# Patient Record
Sex: MALE | Race: WHITE | NOT HISPANIC OR LATINO | URBAN - METROPOLITAN AREA
[De-identification: names, ages, dates, MRNs, and addresses within clinical notes are randomized per-mention and may not be internally consistent; named-entity substitution may affect disease eponyms.]

---

## 2019-01-29 ENCOUNTER — OUTPATIENT (OUTPATIENT)
Dept: OUTPATIENT SERVICES | Facility: HOSPITAL | Age: 59
LOS: 1 days | End: 2019-01-29
Payer: COMMERCIAL

## 2019-01-29 ENCOUNTER — APPOINTMENT (OUTPATIENT)
Dept: ULTRASOUND IMAGING | Facility: CLINIC | Age: 59
End: 2019-01-29
Payer: COMMERCIAL

## 2019-01-29 DIAGNOSIS — Z00.8 ENCOUNTER FOR OTHER GENERAL EXAMINATION: ICD-10-CM

## 2019-01-29 PROCEDURE — 76536 US EXAM OF HEAD AND NECK: CPT

## 2019-01-29 PROCEDURE — 76536 US EXAM OF HEAD AND NECK: CPT | Mod: 26

## 2019-06-13 ENCOUNTER — APPOINTMENT (OUTPATIENT)
Dept: SURGERY | Facility: CLINIC | Age: 59
End: 2019-06-13
Payer: COMMERCIAL

## 2019-06-13 VITALS
BODY MASS INDEX: 28.93 KG/M2 | TEMPERATURE: 98.6 F | DIASTOLIC BLOOD PRESSURE: 72 MMHG | HEIGHT: 78 IN | RESPIRATION RATE: 18 BRPM | HEART RATE: 93 BPM | WEIGHT: 250 LBS | OXYGEN SATURATION: 95 % | SYSTOLIC BLOOD PRESSURE: 116 MMHG

## 2019-06-13 PROCEDURE — 99204 OFFICE O/P NEW MOD 45 MIN: CPT

## 2019-06-13 NOTE — PHYSICAL EXAM
[Normal Breath Sounds] : Normal breath sounds [Normal Heart Sounds] : normal heart sounds [No HSM] : no hepatosplenomegaly [Calm] : calm [No Rash or Lesion] : No rash or lesion [Abdominal Masses] : No abdominal masses [JVD] : no jugular venous distention  [Abdomen Tenderness] : ~T ~M No abdominal tenderness [de-identified] : Well-developed well-nourished white male in no acute distress. [de-identified] : wnl [de-identified] : There are bilateral inguinal hernias the left larger than the right. The left cord and testicle is normal right cord and testicle are normal examination of the right anal area revealed a defect at the internal ring where is able to push some content back inside. [de-identified] : Normal strength and gait

## 2019-06-13 NOTE — HISTORY OF PRESENT ILLNESS
[de-identified] : Germain is a 58 y/o male s/p umbilical hernia repair 2/24/16. Patient reports having pain on right groin area. He patient was in his normal self when he had some episodes of constipation where he had to push hard to have a bowel movement and since then has noticed some pain in the right groin he gives a history of pushing on something that went back inside. Of note the patient had a CT scan in 2016 which revealed bilateral fat-containing inguinal hernias. There is been no nausea no vomiting etc.

## 2019-06-13 NOTE — ASSESSMENT
[FreeTextEntry1] : I discussed with the patient that he has had a known bilateral inguinal hernias since the CAT scan in 2016. On his examination he informed hernias bilaterally no hernias the left larger than the right. I have told the patient that the patient for bilateral hernias the best option for him is to have a bilateral inguinal hernia repair done laparoscopically I asked the patient to see Dr. Zepeda for an evaluation. The only issue is the 1.7 PVC patch used to fix his umbilical hernia in the past.

## 2019-06-14 ENCOUNTER — APPOINTMENT (OUTPATIENT)
Dept: SURGERY | Facility: CLINIC | Age: 59
End: 2019-06-14
Payer: COMMERCIAL

## 2019-06-14 PROCEDURE — 99204 OFFICE O/P NEW MOD 45 MIN: CPT

## 2019-06-14 NOTE — CONSULT LETTER
[Dear  ___] : Dear  [unfilled], [Consult Letter:] : I had the pleasure of evaluating your patient, [unfilled]. [Please see my note below.] : Please see my note below. [Consult Closing:] : Thank you very much for allowing me to participate in the care of this patient.  If you have any questions, please do not hesitate to contact me. [Sincerely,] : Sincerely, [FreeTextEntry3] : Taurus Massey MD, FACS, FASMBS\par Chief Division of Minimally Invasive Surgery\par Co-Director of Bariatric Surgery \par [facility]\[place] [address]

## 2019-06-14 NOTE — PLAN
[FreeTextEntry1] : Laparoscopic bilateral inguinal hernia repair with mesh\par (case to be done at [facility])\par \par Risks and benefits explained and pt wishes to proceed\par \par Specifically discussed risks relating to bleeding, infection, recurrence, and groin pain\par Pt demonstrated understanding \par \par Patient seen and examined note ended and where appropriate and verified agree with laparoscopic bilateral inguinal hernias the risks benefits expectations were discussed at length with the patient all his questions were answered\par Taurus Massey MD, FACS, FASMBS\par Chief Division of Minimally Invasive Surgery\par Co-Director of Bariatric Surgery \par [facility]\[place] [address]

## 2019-06-14 NOTE — PHYSICAL EXAM
[Normal Breath Sounds] : Normal breath sounds [JVD] : no jugular venous distention  [Normal Heart Sounds] : normal heart sounds [No HSM] : no hepatosplenomegaly [Tender] : was nontender [Enlarged] : not enlarged [Alert] : alert [Oriented to Person] : oriented to person [Oriented to Place] : oriented to place [Oriented to Time] : oriented to time [Calm] : calm [de-identified] : nad, hyper [de-identified] : within normal limits diplopia right eye [de-identified] : s, nt, nd, healed umb scar [de-identified] : palpable ing hernia bilaterally small, pain to palpation

## 2019-06-14 NOTE — HISTORY OF PRESENT ILLNESS
[de-identified] : 59M seen by Dr. Velasquez previously with bilateral inguinal hernias [de-identified] : Pt with CT in 2016 showing bilateral fat-containing inguinal hernias\par Now says having R groin pain with activity\par Denies changes in bowel function\par \par Of note, prior open umb hernia repair with mesh 2016

## 2019-06-28 ENCOUNTER — OUTPATIENT (OUTPATIENT)
Dept: OUTPATIENT SERVICES | Facility: HOSPITAL | Age: 59
LOS: 1 days | End: 2019-06-28
Payer: COMMERCIAL

## 2019-06-28 VITALS
SYSTOLIC BLOOD PRESSURE: 127 MMHG | HEIGHT: 78 IN | HEART RATE: 88 BPM | WEIGHT: 253.09 LBS | RESPIRATION RATE: 16 BRPM | TEMPERATURE: 98 F | OXYGEN SATURATION: 98 % | DIASTOLIC BLOOD PRESSURE: 79 MMHG

## 2019-06-28 DIAGNOSIS — Z01.818 ENCOUNTER FOR OTHER PREPROCEDURAL EXAMINATION: ICD-10-CM

## 2019-06-28 DIAGNOSIS — Z98.890 OTHER SPECIFIED POSTPROCEDURAL STATES: Chronic | ICD-10-CM

## 2019-06-28 DIAGNOSIS — Z98.49 CATARACT EXTRACTION STATUS, UNSPECIFIED EYE: Chronic | ICD-10-CM

## 2019-06-28 DIAGNOSIS — K40.20 BILATERAL INGUINAL HERNIA, WITHOUT OBSTRUCTION OR GANGRENE, NOT SPECIFIED AS RECURRENT: ICD-10-CM

## 2019-06-28 LAB
ALBUMIN SERPL ELPH-MCNC: 4.5 G/DL — SIGNIFICANT CHANGE UP (ref 3.3–5)
ALP SERPL-CCNC: 72 U/L — SIGNIFICANT CHANGE UP (ref 40–120)
ALT FLD-CCNC: 72 U/L — HIGH (ref 10–45)
ANION GAP SERPL CALC-SCNC: 15 MMOL/L — SIGNIFICANT CHANGE UP (ref 5–17)
AST SERPL-CCNC: 39 U/L — SIGNIFICANT CHANGE UP (ref 10–40)
BILIRUB SERPL-MCNC: 0.5 MG/DL — SIGNIFICANT CHANGE UP (ref 0.2–1.2)
BUN SERPL-MCNC: 18 MG/DL — SIGNIFICANT CHANGE UP (ref 7–23)
CALCIUM SERPL-MCNC: 9.7 MG/DL — SIGNIFICANT CHANGE UP (ref 8.4–10.5)
CHLORIDE SERPL-SCNC: 106 MMOL/L — SIGNIFICANT CHANGE UP (ref 96–108)
CO2 SERPL-SCNC: 21 MMOL/L — LOW (ref 22–31)
CREAT SERPL-MCNC: 0.9 MG/DL — SIGNIFICANT CHANGE UP (ref 0.5–1.3)
GLUCOSE SERPL-MCNC: 102 MG/DL — HIGH (ref 70–99)
HCT VFR BLD CALC: 43.2 % — SIGNIFICANT CHANGE UP (ref 39–50)
HGB BLD-MCNC: 14.4 G/DL — SIGNIFICANT CHANGE UP (ref 13–17)
MCHC RBC-ENTMCNC: 30 PG — SIGNIFICANT CHANGE UP (ref 27–34)
MCHC RBC-ENTMCNC: 33.3 GM/DL — SIGNIFICANT CHANGE UP (ref 32–36)
MCV RBC AUTO: 90 FL — SIGNIFICANT CHANGE UP (ref 80–100)
PLATELET # BLD AUTO: 153 K/UL — SIGNIFICANT CHANGE UP (ref 150–400)
POTASSIUM SERPL-MCNC: 4.7 MMOL/L — SIGNIFICANT CHANGE UP (ref 3.5–5.3)
POTASSIUM SERPL-SCNC: 4.7 MMOL/L — SIGNIFICANT CHANGE UP (ref 3.5–5.3)
PROT SERPL-MCNC: 7.2 G/DL — SIGNIFICANT CHANGE UP (ref 6–8.3)
RBC # BLD: 4.8 M/UL — SIGNIFICANT CHANGE UP (ref 4.2–5.8)
RBC # FLD: 11.9 % — SIGNIFICANT CHANGE UP (ref 10.3–14.5)
SODIUM SERPL-SCNC: 142 MMOL/L — SIGNIFICANT CHANGE UP (ref 135–145)
WBC # BLD: 6.84 K/UL — SIGNIFICANT CHANGE UP (ref 3.8–10.5)
WBC # FLD AUTO: 6.84 K/UL — SIGNIFICANT CHANGE UP (ref 3.8–10.5)

## 2019-06-28 PROCEDURE — G0463: CPT

## 2019-06-28 PROCEDURE — 85027 COMPLETE CBC AUTOMATED: CPT

## 2019-06-28 PROCEDURE — 80053 COMPREHEN METABOLIC PANEL: CPT

## 2019-06-28 RX ORDER — LIDOCAINE HCL 20 MG/ML
0.2 VIAL (ML) INJECTION ONCE
Refills: 0 | Status: DISCONTINUED | OUTPATIENT
Start: 2019-07-09 | End: 2019-07-09

## 2019-06-28 RX ORDER — CHLORHEXIDINE GLUCONATE 213 G/1000ML
1 SOLUTION TOPICAL DAILY
Refills: 0 | Status: DISCONTINUED | OUTPATIENT
Start: 2019-07-09 | End: 2019-07-09

## 2019-06-28 NOTE — H&P PST ADULT - NSICDXPASTMEDICALHX_GEN_ALL_CORE_FT
PAST MEDICAL HISTORY:  Umbilical hernia ' 2016 PAST MEDICAL HISTORY:  Fatty liver     Umbilical hernia ' 2016 PAST MEDICAL HISTORY:  Bilateral inguinal hernia     Fatty liver     Umbilical hernia ' 2016 PAST MEDICAL HISTORY:  Bilateral inguinal hernia     Deviated nasal septum '99   surgically treated    Fatty liver     History of cataract 6/2019   Right: surgically treated    KARLA (obstructive sleep apnea) per criteria    Umbilical hernia ' 2016  surgically treated

## 2019-06-28 NOTE — H&P PST ADULT - REASON FOR ADMISSION
This is a 59 year old male with PMH: " I have bilateral Inguinal Hernias that are sometimes painful on exertion"

## 2019-06-28 NOTE — H&P PST ADULT - NSICDXPROBLEM_GEN_ALL_CORE_FT
PROBLEM DIAGNOSES  Problem: Bilateral inguinal hernia  Assessment and Plan: Laproscopic Bilateral Inguinal Hernia Repair

## 2019-06-28 NOTE — H&P PST ADULT - HISTORY OF PRESENT ILLNESS
This is a 59 year old male with PMH:  KARLA per criteria, BMI 29.2, former Smoker: 0.5 ppd X 15 years; Quit '14, h/o Fatty Liver, s/p ( '16): Umbilical Hernia Repair with Mesh. Current dx: Bilateral Inguinal Hernia: intermittent painful on exertion. Scheduled: Laproscopic Bilateral Inguinal Hernia Repair .

## 2019-06-28 NOTE — H&P PST ADULT - NSANTHOSAYNRD_GEN_A_CORE
No. KARLA screening performed.  STOP BANG Legend: 0-2 = LOW Risk; 3-4 = INTERMEDIATE Risk; 5-8 = HIGH Risk

## 2019-06-28 NOTE — H&P PST ADULT - NSICDXPASTSURGICALHX_GEN_ALL_CORE_FT
PAST SURGICAL HISTORY:  H/O nasal septoplasty '99    History of umbilical hernia repair ' 2016  with Mesh    S/P cataract extraction 6/2019   Right    S/P colonoscopy ' 16  Negative

## 2019-07-08 ENCOUNTER — TRANSCRIPTION ENCOUNTER (OUTPATIENT)
Age: 59
End: 2019-07-08

## 2019-07-08 RX ORDER — SODIUM CHLORIDE 9 MG/ML
3 INJECTION INTRAMUSCULAR; INTRAVENOUS; SUBCUTANEOUS EVERY 8 HOURS
Refills: 0 | Status: DISCONTINUED | OUTPATIENT
Start: 2019-07-09 | End: 2019-07-09

## 2019-07-08 RX ORDER — CEFAZOLIN SODIUM 1 G
2000 VIAL (EA) INJECTION ONCE
Refills: 0 | Status: DISCONTINUED | OUTPATIENT
Start: 2019-07-09 | End: 2019-07-09

## 2019-07-09 ENCOUNTER — OUTPATIENT (OUTPATIENT)
Dept: OUTPATIENT SERVICES | Facility: HOSPITAL | Age: 59
LOS: 1 days | End: 2019-07-09
Payer: COMMERCIAL

## 2019-07-09 ENCOUNTER — APPOINTMENT (OUTPATIENT)
Dept: SURGERY | Facility: HOSPITAL | Age: 59
End: 2019-07-09
Payer: COMMERCIAL

## 2019-07-09 VITALS
SYSTOLIC BLOOD PRESSURE: 108 MMHG | OXYGEN SATURATION: 98 % | TEMPERATURE: 98 F | RESPIRATION RATE: 18 BRPM | DIASTOLIC BLOOD PRESSURE: 75 MMHG | WEIGHT: 253.09 LBS | HEART RATE: 74 BPM | HEIGHT: 78 IN

## 2019-07-09 VITALS
DIASTOLIC BLOOD PRESSURE: 72 MMHG | HEART RATE: 76 BPM | TEMPERATURE: 97 F | OXYGEN SATURATION: 100 % | RESPIRATION RATE: 17 BRPM | SYSTOLIC BLOOD PRESSURE: 136 MMHG

## 2019-07-09 DIAGNOSIS — K40.20 BILATERAL INGUINAL HERNIA, WITHOUT OBSTRUCTION OR GANGRENE, NOT SPECIFIED AS RECURRENT: ICD-10-CM

## 2019-07-09 DIAGNOSIS — Z98.890 OTHER SPECIFIED POSTPROCEDURAL STATES: Chronic | ICD-10-CM

## 2019-07-09 DIAGNOSIS — Z98.49 CATARACT EXTRACTION STATUS, UNSPECIFIED EYE: Chronic | ICD-10-CM

## 2019-07-09 DIAGNOSIS — Z01.818 ENCOUNTER FOR OTHER PREPROCEDURAL EXAMINATION: ICD-10-CM

## 2019-07-09 PROCEDURE — C1781: CPT

## 2019-07-09 PROCEDURE — 49650 LAP ING HERNIA REPAIR INIT: CPT | Mod: 50

## 2019-07-09 PROCEDURE — C1727: CPT

## 2019-07-09 PROCEDURE — 49650 LAP ING HERNIA REPAIR INIT: CPT | Mod: 82,50

## 2019-07-09 RX ORDER — HALOPERIDOL DECANOATE 100 MG/ML
1 INJECTION INTRAMUSCULAR ONCE
Refills: 0 | Status: DISCONTINUED | OUTPATIENT
Start: 2019-07-09 | End: 2019-07-24

## 2019-07-09 RX ORDER — HYDROMORPHONE HYDROCHLORIDE 2 MG/ML
0.5 INJECTION INTRAMUSCULAR; INTRAVENOUS; SUBCUTANEOUS
Refills: 0 | Status: DISCONTINUED | OUTPATIENT
Start: 2019-07-09 | End: 2019-07-09

## 2019-07-09 RX ORDER — OXYCODONE HYDROCHLORIDE 5 MG/1
1 TABLET ORAL
Qty: 20 | Refills: 0
Start: 2019-07-09 | End: 2019-07-13

## 2019-07-09 RX ORDER — OXYCODONE HYDROCHLORIDE 5 MG/1
1 TABLET ORAL
Qty: 12 | Refills: 0
Start: 2019-07-09

## 2019-07-09 RX ADMIN — HYDROMORPHONE HYDROCHLORIDE 0.5 MILLIGRAM(S): 2 INJECTION INTRAMUSCULAR; INTRAVENOUS; SUBCUTANEOUS at 19:52

## 2019-07-09 RX ADMIN — HYDROMORPHONE HYDROCHLORIDE 0.5 MILLIGRAM(S): 2 INJECTION INTRAMUSCULAR; INTRAVENOUS; SUBCUTANEOUS at 18:14

## 2019-07-09 RX ADMIN — HYDROMORPHONE HYDROCHLORIDE 0.5 MILLIGRAM(S): 2 INJECTION INTRAMUSCULAR; INTRAVENOUS; SUBCUTANEOUS at 18:04

## 2019-07-09 RX ADMIN — HYDROMORPHONE HYDROCHLORIDE 0.5 MILLIGRAM(S): 2 INJECTION INTRAMUSCULAR; INTRAVENOUS; SUBCUTANEOUS at 19:39

## 2019-07-09 NOTE — ASU DISCHARGE PLAN (ADULT/PEDIATRIC) - CALL YOUR DOCTOR IF YOU HAVE ANY OF THE FOLLOWING:
Unable to urinate/Inability to tolerate liquids or foods/Wound/Surgical Site with redness, or foul smelling discharge or pus

## 2019-07-09 NOTE — BRIEF OPERATIVE NOTE - OPERATION/FINDINGS
right indirect hernia and cord lipoma  left large direct defect  bilateral ProGrip placed, tacked (absorbable)

## 2019-07-09 NOTE — ASU DISCHARGE PLAN (ADULT/PEDIATRIC) - CARE PROVIDER_API CALL
Taurus Massey)  Surgery  [address], [address]  [place] [address]  Phone: [phone]  Fax: [phone]  Follow Up Time: 2 weeks

## 2019-07-09 NOTE — ASU DISCHARGE PLAN (ADULT/PEDIATRIC) - ASU DC SPECIAL INSTRUCTIONSFT
take extra strength tylenol routinely for first few days  can alternate with ibuprofen for added pain control  use oxycodone for any significant breakthrough pain while doing this  when pain less switch to tylenol only as needed take extra strength Tylenol routinely for first few days  can alternate with ibuprofen for added pain control  use oxycodone for any significant breakthrough pain while doing this  when pain less switch to Tylenol only as needed

## 2019-07-09 NOTE — BRIEF OPERATIVE NOTE - NSICDXBRIEFPROCEDURE_GEN_ALL_CORE_FT
PROCEDURES:  Laparoscopic herniorrhaphy of bilateral inguinal hernias 09-Jul-2019 17:21:58  [facility]

## 2019-07-11 ENCOUNTER — EMERGENCY (EMERGENCY)
Facility: HOSPITAL | Age: 59
LOS: 1 days | Discharge: HOME | End: 2019-07-11
Attending: EMERGENCY MEDICINE
Payer: COMMERCIAL

## 2019-07-11 VITALS
HEIGHT: 78 IN | RESPIRATION RATE: 18 BRPM | HEART RATE: 113 BPM | OXYGEN SATURATION: 97 % | TEMPERATURE: 100 F | SYSTOLIC BLOOD PRESSURE: 126 MMHG | WEIGHT: 250 LBS | DIASTOLIC BLOOD PRESSURE: 82 MMHG

## 2019-07-11 DIAGNOSIS — Z98.890 OTHER SPECIFIED POSTPROCEDURAL STATES: Chronic | ICD-10-CM

## 2019-07-11 DIAGNOSIS — Z98.49 CATARACT EXTRACTION STATUS, UNSPECIFIED EYE: Chronic | ICD-10-CM

## 2019-07-11 LAB
ALBUMIN SERPL ELPH-MCNC: 4 G/DL — SIGNIFICANT CHANGE UP (ref 3.3–5)
ALP SERPL-CCNC: 59 U/L — SIGNIFICANT CHANGE UP (ref 40–120)
ALT FLD-CCNC: 41 U/L — SIGNIFICANT CHANGE UP (ref 10–45)
ANION GAP SERPL CALC-SCNC: 13 MMOL/L — SIGNIFICANT CHANGE UP (ref 5–17)
APTT BLD: 31.7 SEC — SIGNIFICANT CHANGE UP (ref 27.5–36.3)
AST SERPL-CCNC: 25 U/L — SIGNIFICANT CHANGE UP (ref 10–40)
BASOPHILS # BLD AUTO: 0 K/UL — SIGNIFICANT CHANGE UP (ref 0–0.2)
BASOPHILS NFR BLD AUTO: 0.3 % — SIGNIFICANT CHANGE UP (ref 0–2)
BILIRUB SERPL-MCNC: 0.9 MG/DL — SIGNIFICANT CHANGE UP (ref 0.2–1.2)
BUN SERPL-MCNC: 11 MG/DL — SIGNIFICANT CHANGE UP (ref 7–23)
CALCIUM SERPL-MCNC: 8.7 MG/DL — SIGNIFICANT CHANGE UP (ref 8.4–10.5)
CHLORIDE SERPL-SCNC: 101 MMOL/L — SIGNIFICANT CHANGE UP (ref 96–108)
CO2 SERPL-SCNC: 23 MMOL/L — SIGNIFICANT CHANGE UP (ref 22–31)
CREAT SERPL-MCNC: 0.95 MG/DL — SIGNIFICANT CHANGE UP (ref 0.5–1.3)
EOSINOPHIL # BLD AUTO: 0.3 K/UL — SIGNIFICANT CHANGE UP (ref 0–0.5)
EOSINOPHIL NFR BLD AUTO: 4.3 % — SIGNIFICANT CHANGE UP (ref 0–6)
GAS PNL BLDV: SIGNIFICANT CHANGE UP
GLUCOSE SERPL-MCNC: 95 MG/DL — SIGNIFICANT CHANGE UP (ref 70–99)
HCT VFR BLD CALC: 40.2 % — SIGNIFICANT CHANGE UP (ref 39–50)
HGB BLD-MCNC: 13.8 G/DL — SIGNIFICANT CHANGE UP (ref 13–17)
INR BLD: 1.11 RATIO — SIGNIFICANT CHANGE UP (ref 0.88–1.16)
LYMPHOCYTES # BLD AUTO: 1.3 K/UL — SIGNIFICANT CHANGE UP (ref 1–3.3)
LYMPHOCYTES # BLD AUTO: 17.4 % — SIGNIFICANT CHANGE UP (ref 13–44)
MCHC RBC-ENTMCNC: 31.2 PG — SIGNIFICANT CHANGE UP (ref 27–34)
MCHC RBC-ENTMCNC: 34.4 GM/DL — SIGNIFICANT CHANGE UP (ref 32–36)
MCV RBC AUTO: 90.5 FL — SIGNIFICANT CHANGE UP (ref 80–100)
MONOCYTES # BLD AUTO: 1 K/UL — HIGH (ref 0–0.9)
MONOCYTES NFR BLD AUTO: 14.1 % — HIGH (ref 2–14)
NEUTROPHILS # BLD AUTO: 4.7 K/UL — SIGNIFICANT CHANGE UP (ref 1.8–7.4)
NEUTROPHILS NFR BLD AUTO: 63.8 % — SIGNIFICANT CHANGE UP (ref 43–77)
PLATELET # BLD AUTO: 125 K/UL — LOW (ref 150–400)
POTASSIUM SERPL-MCNC: 4.3 MMOL/L — SIGNIFICANT CHANGE UP (ref 3.5–5.3)
POTASSIUM SERPL-SCNC: 4.3 MMOL/L — SIGNIFICANT CHANGE UP (ref 3.5–5.3)
PROT SERPL-MCNC: 6.7 G/DL — SIGNIFICANT CHANGE UP (ref 6–8.3)
PROTHROM AB SERPL-ACNC: 12.7 SEC — SIGNIFICANT CHANGE UP (ref 10–12.9)
RBC # BLD: 4.44 M/UL — SIGNIFICANT CHANGE UP (ref 4.2–5.8)
RBC # FLD: 11 % — SIGNIFICANT CHANGE UP (ref 10.3–14.5)
SODIUM SERPL-SCNC: 137 MMOL/L — SIGNIFICANT CHANGE UP (ref 135–145)
WBC # BLD: 7.4 K/UL — SIGNIFICANT CHANGE UP (ref 3.8–10.5)
WBC # FLD AUTO: 7.4 K/UL — SIGNIFICANT CHANGE UP (ref 3.8–10.5)

## 2019-07-11 PROCEDURE — 74177 CT ABD & PELVIS W/CONTRAST: CPT | Mod: 26

## 2019-07-11 PROCEDURE — 93010 ELECTROCARDIOGRAM REPORT: CPT | Mod: 59

## 2019-07-11 PROCEDURE — 99285 EMERGENCY DEPT VISIT HI MDM: CPT

## 2019-07-11 PROCEDURE — 71046 X-RAY EXAM CHEST 2 VIEWS: CPT | Mod: 26

## 2019-07-11 RX ORDER — SODIUM CHLORIDE 9 MG/ML
3500 INJECTION, SOLUTION INTRAVENOUS ONCE
Refills: 0 | Status: COMPLETED | OUTPATIENT
Start: 2019-07-11 | End: 2019-07-11

## 2019-07-11 RX ORDER — CIPROFLOXACIN LACTATE 400MG/40ML
400 VIAL (ML) INTRAVENOUS ONCE
Refills: 0 | Status: COMPLETED | OUTPATIENT
Start: 2019-07-11 | End: 2019-07-11

## 2019-07-11 RX ORDER — METRONIDAZOLE 500 MG
500 TABLET ORAL ONCE
Refills: 0 | Status: COMPLETED | OUTPATIENT
Start: 2019-07-11 | End: 2019-07-11

## 2019-07-11 RX ORDER — MORPHINE SULFATE 50 MG/1
4 CAPSULE, EXTENDED RELEASE ORAL ONCE
Refills: 0 | Status: DISCONTINUED | OUTPATIENT
Start: 2019-07-11 | End: 2019-07-11

## 2019-07-11 RX ORDER — ACETAMINOPHEN 500 MG
975 TABLET ORAL ONCE
Refills: 0 | Status: COMPLETED | OUTPATIENT
Start: 2019-07-11 | End: 2019-07-11

## 2019-07-11 RX ADMIN — Medication 975 MILLIGRAM(S): at 19:12

## 2019-07-11 RX ADMIN — SODIUM CHLORIDE 3500 MILLILITER(S): 9 INJECTION, SOLUTION INTRAVENOUS at 19:15

## 2019-07-11 RX ADMIN — MORPHINE SULFATE 4 MILLIGRAM(S): 50 CAPSULE, EXTENDED RELEASE ORAL at 19:12

## 2019-07-11 RX ADMIN — Medication 200 MILLIGRAM(S): at 23:27

## 2019-07-11 NOTE — ED PROVIDER NOTE - CARE PROVIDERS DIRECT ADDRESSES
[email or web address]

## 2019-07-11 NOTE — ED PROVIDER NOTE - NS ED ROS FT
GENERAL: + fever or chills, EYES: no change in vision, HEENT: no trouble speaking, CARDIAC: no chest pain, palpitation PULMONARY: no cough or SOB, GI: + abdominal pain, no nausea, no vomiting, no diarrhea or constipation, : No changes in urination, SKIN: redness, NEURO: no headache,  MSK: No muscle pain ~Amy Martinez MD

## 2019-07-11 NOTE — ED ADULT NURSE NOTE - OBJECTIVE STATEMENT
58 y/o male s/p bilateral inguinal hernia repair 7/9,  pt c/o fever and lightheaded  today, and lower abd pain since surgery.   Pt denies chest pain, SOB,  n/v/d, blood in stool, urinary symptoms.  Pt with abd dressing dry and intact with skin redness noted below dressing.  Pt also with 2 steri strips to lower abd dry and intact.  Respiration easy and non labored.  Extremities mobile. 58 y/o male s/p bilateral inguinal hernia repair 7/9,  pt c/o fever and lightheaded  today, and lower abd pain since surgery.   Pt denies chest pain, SOB,  n/v/d, blood in stool, urinary symptoms.  Pt c/o redness and itchiness at site of surgery.   Pt with abd dressing dry and intact with skin redness noted below dressing.  Pt also with 2 steri strips to lower abd dry and intact.  Respiration easy and non labored.  Extremities mobile.

## 2019-07-11 NOTE — ED ADULT TRIAGE NOTE - CADM TRG TX PRIOR TO ARRIVAL
Continue current medications as prescribed. Continue to follow up with primary care provider for non cardiac medical problems. Call the office with any problems, questions or concerns at [phone]. Follow up as scheduled with your cardiologist - 6 months Dr. Gali Garvin and oumour check. The following educational material has been included in this after visit summary for your review: heart health.     Additional instructions:  Primary care doctor - Dr. Lai Begum, Dr. James Parrish (with [facility])). Dr. Mara Velasquez - 3rd [facility] practice  Coronary artery disease risk factors you can control: Smoking, high blood pressure, high cholesterol, diabetes, being overweight, lack of exercise and stress. Continue heart healthy diet. Take medications as directed. Exercise as tolerated. Strive for 15 minutes of exercise most days of the week. If asked to keep a blood pressure log, do so for 2 weeks. Call the office to report readings at [phone]. Blood pressure goal is 140/90 or less. If you are a diabetic, the goal is 130/80 or less. If you are taking cholesterol lowering medications, it is recommended that lab work be checked annually. Always keep a current medication list. Bring your medications to every office visit.        Patient Education        A Healthy Heart: Care Instructions  Your Care Instructions    Heart disease occurs when a substance called plaque builds up in the vessels that supply oxygen-rich blood to your heart. This can narrow the blood vessels and reduce blood flow. A heart attack happens when blood flow is completely blocked. A high-fat diet, smoking, and other factors increase the risk of heart disease. Your doctor has found that you have a chance of having heart disease. You can do lots of things to keep your heart healthy. It may not be easy, but you can change your diet, exercise more, and quit smoking.  These steps really work to lower your chance of heart
none

## 2019-07-11 NOTE — ED PROVIDER NOTE - PSH
H/O nasal septoplasty  '99  History of umbilical hernia repair  ' 2016  with Mesh  S/P cataract extraction  6/2019   Right  S/P colonoscopy  ' 16  Negative

## 2019-07-11 NOTE — ED PROVIDER NOTE - OBJECTIVE STATEMENT
Amy Martinez MD: 58 yo M s/p b/l inguinal hernia s/p2 days p/w nonradiating lower abd pain b/l with fevers Tmax 101.4 ear x 1 day. Pt states that the pain feels sourness. Aggravated by moving and laying makes it better. Redness and itchiness at the site of incision site x 1 day. Last BM today. denies cp, sob, cough, n/v/d, constipation blood stool or urine.  Pt did not take anything for the fever. Amy Martinez MD: 58 yo M s/p b/l inguinal hernia s/p2 days p/w nonradiated lower abd pain b/l with fevers Tmax 101.4 ear x 1 day. Pt states that the pain feels sourness. Aggravated by moving and laying makes it better. Redness and itchiness at the site of incision site x 1 day. Last BM today. denies cp, sob, cough, n/v/d, constipation blood stool or urine.  Pt did not take anything for the fever.

## 2019-07-11 NOTE — ED PROVIDER NOTE - PROGRESS NOTE DETAILS
Seen by surgical resident. CT shows fluid collection in left inguinal region concerning for abscess. CT shows fluid collection in left inguinal region concerning for abscess seen by surgical  resident advices no antibiotics and discharge pt  home

## 2019-07-11 NOTE — ED PROVIDER NOTE - CARE PROVIDER_API CALL
Taurus Massey)  Surgery  [address], [address]  [place] [address]  Phone: [phone]  Fax: [phone]  Follow Up Time:

## 2019-07-11 NOTE — CONSULT NOTE ADULT - SUBJECTIVE AND OBJECTIVE BOX
[facility] Surgical Consultant Evaluation    HPI: 59M POD2 s/p recent laparoscopic bilateral hernia repair presenting with fever to 101.4, abdominal pain for the last few days. Of note, he says that he has been taking only 10mg of oxycodone every day. Has not been taking tylenol. Otherwise has ambulating, tolerating PO intake.     On presentation, vitals were notable for tachycardia to 110s. Otherwise hemodynamically normal. Labs were drawn, which were unremarkable, no leukocytosis.       PAST MEDICAL & SURGICAL HISTORY:  KARLA (obstructive sleep apnea): per criteria  History of cataract: 6/2019   Right: surgically treated  Deviated nasal septum: &#x27;99   surgically treated  Bilateral inguinal hernia  Fatty liver  Umbilical hernia: &#x27; 2016  surgically treated  S/P cataract extraction: 6/2019   Right  S/P colonoscopy: &#x27; 16  Negative  History of umbilical hernia repair: &#x27; 2016  with Mesh  H/O nasal septoplasty: &#x27;99      ___________________________________________  REVIEW OF SYSTEMS:  As stated in History of Present Illness, otherwise non-contributory.   ___________________________________________  PHYSICAL EXAM:  Vital Signs Last 24 Hrs  T(C): 37.4 (11 Jul 2019 18:20), Max: 37.8 (11 Jul 2019 17:52)  T(F): 99.4 (11 Jul 2019 18:20), Max: 100 (11 Jul 2019 17:52)  HR: 94 (11 Jul 2019 18:20) (94 - 113)  BP: 122/71 (11 Jul 2019 18:20) (122/71 - 126/82)  BP(mean): --  RR: 18 (11 Jul 2019 18:20) (18 - 18)  SpO2: 98% (11 Jul 2019 18:20) (97% - 98%)CAPILLARY BLOOD GLUCOSE        I&O's Detail      General: Alert and Oriented x3, No acute distress.  Neuro: CN II-XII intact, motor and sensory grossly intact with no focal deficits.  HEENT: Normocephalic, atraumatic, EOMI, anicteric sclerae.  Respiratory: Clear to auscultation bilaterally, breathing non-labored.  CVS: Regular rate and rhythm  Abdomen: Soft, nondistended, nontender. No rebound, no guarding, No palpable organomegaly or masses.  Extremities: Warm bilaterally with palpable pulses.  MSK: Intact ROM.  ____________________________________________  LABS:  CBC Full  -  ( 11 Jul 2019 19:05 )  WBC Count : 7.4 K/uL  RBC Count : 4.44 M/uL  Hemoglobin : 13.8 g/dL  Hematocrit : 40.2 %  Platelet Count - Automated : 125 K/uL  Mean Cell Volume : 90.5 fl  Mean Cell Hemoglobin : 31.2 pg  Mean Cell Hemoglobin Concentration : 34.4 gm/dL  Auto Neutrophil # : 4.7 K/uL  Auto Lymphocyte # : 1.3 K/uL  Auto Monocyte # : 1.0 K/uL  Auto Eosinophil # : 0.3 K/uL  Auto Basophil # : 0.0 K/uL  Auto Neutrophil % : 63.8 %  Auto Lymphocyte % : 17.4 %  Auto Monocyte % : 14.1 %  Auto Eosinophil % : 4.3 %  Auto Basophil % : 0.3 %    07-11    137  |  101  |  11  ----------------------------<  95  4.3   |  23  |  0.95    Ca    8.7      11 Jul 2019 19:05    TPro  6.7  /  Alb  4.0  /  TBili  0.9  /  DBili  x   /  AST  25  /  ALT  41  /  AlkPhos  59  07-11    LIVER FUNCTIONS - ( 11 Jul 2019 19:05 )  Alb: 4.0 g/dL / Pro: 6.7 g/dL / ALK PHOS: 59 U/L / ALT: 41 U/L / AST: 25 U/L / GGT: x           PT/INR - ( 11 Jul 2019 19:05 )   PT: 12.7 sec;   INR: 1.11 ratio         PTT - ( 11 Jul 2019 19:05 )  PTT:31.7 sec        ____________________________________________  RADIOLOGY:  < from: CT Abdomen and Pelvis w/ Oral Cont and w/ IV Cont (07.11.19 @ 20:36) >  FINDINGS:    LOWER CHEST: Within normal limits.    LIVER: 3.7 cm right hepatic lobe cyst.  BILE DUCTS: Normal caliber.  GALLBLADDER: Within normal limits.  SPLEEN: Within normal limits.  PANCREAS: Within normal limits.  ADRENALS: Within normal limits.  KIDNEYS/URETERS: Within normal limits.    BLADDER: Within normal limits.  REPRODUCTIVE ORGANS: Prostate within normal limits.    BOWEL: No bowel obstruction. Appendix not seen.  PERITONEUM: No ascites.  VESSELS:  Within normal limits.  RETROPERITONEUM: No lymphadenopathy.    ABDOMINAL WALL: Trace foci of air within the ventral abdominal wall   subcutaneous tissue and myofascial planes, likely postoperative.   Inflammatory change along the central low abdomen/pelvic wall and fluid   which extends into the bilateral inguinal canals and extending into the   anterior pelvic wall. Partially walled off peripherally enhancing fluid   collection the left inguinal hernia measures 2.3 cm.  BONES: Degenerative change.    IMPRESSION:     2.3 cm left inguinal canal peripherally enhancing fluid collection   concerning for developing abscess..    < end of copied text >

## 2019-07-11 NOTE — ED PROVIDER NOTE - PHYSICAL EXAMINATION
Gen: AAOx3, non-toxic  Head: NCAT  HEENT: EOMI, PERRLA, oral mucosa moist, normal conjunctiva  Lung: CTAB, no respiratory distress, no wheezes/rhonchi/rales B/L, speaking in full sentences  CV: RRR, no murmurs, rubs or gallops  Abd: soft,  diffuse TTP, ND, no guarding, no CVA tenderness, no rebound tenderness  MSK: no visible deformities, full range of motion of all 4 exts  Neuro: No focal sensory or motor deficits  Skin: Warm, well perfused, no rash, erythema around the umbilical incision  Psych: normal affect.   ~Amy Martinez MD

## 2019-07-11 NOTE — ED PROVIDER NOTE - CLINICAL SUMMARY MEDICAL DECISION MAKING FREE TEXT BOX
59 years old male s/p bilateral iguinal hernia repair on Tuesday by Dr. Taurus Diallo presented today with abdominal pain and fever, no problem urinating, no cough. Will obtain blood work, UA, CT Scan to r/o abscess and a surgical consultation and re-evaluation. ZR 59 years old male s/p bilateral iguinal hernia repair on Tuesday by Dr. Taurus Diallo presented today with abdominal pain and fever, no problem urinating, no cough. Will obtain blood work, UA, CT Scan to r/o abscess and a surgical consultation and re-evaluation. ZR    Patient overall improved s/p Cipro/Flagyl in the ED. Patient is hemodynamically stable for discharge with course of abx as well as close outpatient surgical follow up.

## 2019-07-11 NOTE — ED PROVIDER NOTE - PMH
Bilateral inguinal hernia    Deviated nasal septum  '99   surgically treated  Fatty liver    History of cataract  6/2019   Right: surgically treated  KARLA (obstructive sleep apnea)  per criteria  Umbilical hernia  ' 2016  surgically treated

## 2019-07-12 VITALS
OXYGEN SATURATION: 100 % | DIASTOLIC BLOOD PRESSURE: 74 MMHG | HEART RATE: 77 BPM | SYSTOLIC BLOOD PRESSURE: 125 MMHG | TEMPERATURE: 98 F | RESPIRATION RATE: 18 BRPM

## 2019-07-12 PROBLEM — J34.2 DEVIATED NASAL SEPTUM: Chronic | Status: ACTIVE | Noted: 2019-06-28

## 2019-07-12 PROBLEM — G47.33 OBSTRUCTIVE SLEEP APNEA (ADULT) (PEDIATRIC): Chronic | Status: ACTIVE | Noted: 2019-06-28

## 2019-07-12 PROBLEM — K42.9 UMBILICAL HERNIA WITHOUT OBSTRUCTION OR GANGRENE: Chronic | Status: ACTIVE | Noted: 2019-06-28

## 2019-07-12 PROBLEM — K76.0 FATTY (CHANGE OF) LIVER, NOT ELSEWHERE CLASSIFIED: Chronic | Status: ACTIVE | Noted: 2019-06-28

## 2019-07-12 PROBLEM — K40.20 BILATERAL INGUINAL HERNIA, WITHOUT OBSTRUCTION OR GANGRENE, NOT SPECIFIED AS RECURRENT: Chronic | Status: ACTIVE | Noted: 2019-06-28

## 2019-07-12 PROBLEM — Z86.69 PERSONAL HISTORY OF OTHER DISEASES OF THE NERVOUS SYSTEM AND SENSE ORGANS: Chronic | Status: ACTIVE | Noted: 2019-06-28

## 2019-07-12 LAB
APPEARANCE UR: CLEAR — SIGNIFICANT CHANGE UP
BILIRUB UR-MCNC: NEGATIVE — SIGNIFICANT CHANGE UP
COLOR SPEC: YELLOW — SIGNIFICANT CHANGE UP
CULTURE RESULTS: NO GROWTH — SIGNIFICANT CHANGE UP
DIFF PNL FLD: NEGATIVE — SIGNIFICANT CHANGE UP
GLUCOSE UR QL: NEGATIVE — SIGNIFICANT CHANGE UP
KETONES UR-MCNC: SIGNIFICANT CHANGE UP
LEUKOCYTE ESTERASE UR-ACNC: NEGATIVE — SIGNIFICANT CHANGE UP
NITRITE UR-MCNC: NEGATIVE — SIGNIFICANT CHANGE UP
PH UR: 6 — SIGNIFICANT CHANGE UP (ref 5–8)
PROT UR-MCNC: SIGNIFICANT CHANGE UP
SP GR SPEC: >1.05 (ref 1.01–1.02)
SPECIMEN SOURCE: SIGNIFICANT CHANGE UP
UROBILINOGEN FLD QL: NEGATIVE — SIGNIFICANT CHANGE UP

## 2019-07-12 PROCEDURE — 82803 BLOOD GASES ANY COMBINATION: CPT

## 2019-07-12 PROCEDURE — 84132 ASSAY OF SERUM POTASSIUM: CPT

## 2019-07-12 PROCEDURE — 71046 X-RAY EXAM CHEST 2 VIEWS: CPT

## 2019-07-12 PROCEDURE — 82435 ASSAY OF BLOOD CHLORIDE: CPT

## 2019-07-12 PROCEDURE — 74177 CT ABD & PELVIS W/CONTRAST: CPT

## 2019-07-12 PROCEDURE — 87086 URINE CULTURE/COLONY COUNT: CPT

## 2019-07-12 PROCEDURE — 96375 TX/PRO/DX INJ NEW DRUG ADDON: CPT

## 2019-07-12 PROCEDURE — 87040 BLOOD CULTURE FOR BACTERIA: CPT

## 2019-07-12 PROCEDURE — 83605 ASSAY OF LACTIC ACID: CPT

## 2019-07-12 PROCEDURE — 93005 ELECTROCARDIOGRAM TRACING: CPT

## 2019-07-12 PROCEDURE — 81003 URINALYSIS AUTO W/O SCOPE: CPT

## 2019-07-12 PROCEDURE — 85730 THROMBOPLASTIN TIME PARTIAL: CPT

## 2019-07-12 PROCEDURE — 85610 PROTHROMBIN TIME: CPT

## 2019-07-12 PROCEDURE — 96374 THER/PROPH/DIAG INJ IV PUSH: CPT | Mod: XU

## 2019-07-12 PROCEDURE — 84295 ASSAY OF SERUM SODIUM: CPT

## 2019-07-12 PROCEDURE — 80053 COMPREHEN METABOLIC PANEL: CPT

## 2019-07-12 PROCEDURE — 85027 COMPLETE CBC AUTOMATED: CPT

## 2019-07-12 PROCEDURE — 85014 HEMATOCRIT: CPT

## 2019-07-12 PROCEDURE — 82947 ASSAY GLUCOSE BLOOD QUANT: CPT

## 2019-07-12 PROCEDURE — 82330 ASSAY OF CALCIUM: CPT

## 2019-07-12 PROCEDURE — 99284 EMERGENCY DEPT VISIT MOD MDM: CPT | Mod: 25

## 2019-07-12 RX ORDER — MOXIFLOXACIN HYDROCHLORIDE TABLETS, 400 MG 400 MG/1
1 TABLET, FILM COATED ORAL
Qty: 5 | Refills: 0
Start: 2019-07-12 | End: 2019-07-16

## 2019-07-12 RX ORDER — MOXIFLOXACIN HYDROCHLORIDE TABLETS, 400 MG 400 MG/1
1 TABLET, FILM COATED ORAL
Qty: 10 | Refills: 0
Start: 2019-07-12 | End: 2019-07-16

## 2019-07-12 RX ORDER — MOXIFLOXACIN HYDROCHLORIDE TABLETS, 400 MG 400 MG/1
1 TABLET, FILM COATED ORAL
Qty: 4 | Refills: 0
Start: 2019-07-12 | End: 2019-07-15

## 2019-07-12 RX ORDER — AZITHROMYCIN 500 MG/1
1 TABLET, FILM COATED ORAL
Qty: 4 | Refills: 0
Start: 2019-07-12 | End: 2019-07-15

## 2019-07-12 RX ORDER — METRONIDAZOLE 500 MG
1 TABLET ORAL
Qty: 15 | Refills: 0
Start: 2019-07-12 | End: 2019-07-16

## 2019-07-12 RX ADMIN — Medication 100 MILLIGRAM(S): at 00:37

## 2019-07-16 LAB
CULTURE RESULTS: SIGNIFICANT CHANGE UP
CULTURE RESULTS: SIGNIFICANT CHANGE UP
SPECIMEN SOURCE: SIGNIFICANT CHANGE UP
SPECIMEN SOURCE: SIGNIFICANT CHANGE UP

## 2019-07-22 ENCOUNTER — APPOINTMENT (OUTPATIENT)
Dept: SURGERY | Facility: CLINIC | Age: 59
End: 2019-07-22
Payer: COMMERCIAL

## 2019-07-22 VITALS
HEIGHT: 78 IN | WEIGHT: 250 LBS | RESPIRATION RATE: 17 BRPM | DIASTOLIC BLOOD PRESSURE: 83 MMHG | BODY MASS INDEX: 28.93 KG/M2 | SYSTOLIC BLOOD PRESSURE: 123 MMHG | HEART RATE: 78 BPM | TEMPERATURE: 98.2 F | OXYGEN SATURATION: 97 %

## 2019-07-22 DIAGNOSIS — K40.20 BILATERAL INGUINAL HERNIA, W/OUT OBSTRUCTION OR GANGRENE, NOT SPECIFIED AS RECURRENT: ICD-10-CM

## 2019-07-22 PROCEDURE — 99024 POSTOP FOLLOW-UP VISIT: CPT

## 2019-07-22 NOTE — PHYSICAL EXAM
[Normal Heart Sounds] : normal heart sounds [JVD] : no jugular venous distention  [Normal Breath Sounds] : Normal breath sounds [de-identified] : normal [de-identified] : Soft nontender nondistended there are no hernias or masses present healed surgical scars bilateral inguinal floors are solid and impacted testicles are within normal limits no ecchymosis [de-identified] : ambulating wi withoutdifficulty

## 2019-07-22 NOTE — ASSESSMENT
[FreeTextEntry1] : 59-year-old male status post bilateral inguinal hernia repair laparoscopically he is doing very well he has no complaints he will followup as needed all his questions were answered

## 2019-07-22 NOTE — REASON FOR VISIT
[Post Op: _________] : a [unfilled] post op visit [FreeTextEntry1] : Laparoscopic preperitoneal repair of bilateral inguinal hernias with ProGrip mesh, left side is direct and  right side is indirect.

## 2019-09-17 ENCOUNTER — APPOINTMENT (OUTPATIENT)
Dept: SURGERY | Facility: CLINIC | Age: 59
End: 2019-09-17
Payer: COMMERCIAL

## 2019-09-17 VITALS
HEART RATE: 95 BPM | OXYGEN SATURATION: 98 % | RESPIRATION RATE: 16 BRPM | SYSTOLIC BLOOD PRESSURE: 134 MMHG | DIASTOLIC BLOOD PRESSURE: 91 MMHG

## 2019-09-17 DIAGNOSIS — Z98.890 OTHER SPECIFIED POSTPROCEDURAL STATES: ICD-10-CM

## 2019-09-17 PROCEDURE — 99024 POSTOP FOLLOW-UP VISIT: CPT

## 2019-09-17 RX ORDER — MELOXICAM 7.5 MG/1
7.5 TABLET ORAL DAILY
Qty: 30 | Refills: 0 | Status: ACTIVE | COMMUNITY
Start: 2019-09-17 | End: 1900-01-01

## 2019-09-17 RX ORDER — BIOTIN 10 MG
TABLET ORAL
Refills: 0 | Status: ACTIVE | COMMUNITY

## 2019-09-17 RX ORDER — MULTIVITAMIN
TABLET ORAL
Refills: 0 | Status: ACTIVE | COMMUNITY

## 2019-09-17 NOTE — PHYSICAL EXAM
[de-identified] : A bilateral inguinal hernia repairs are intact. Most of the patient's discomfort is in the skin and subcutaneous tissue. There is no evidence of any inflammation or hematoma.

## 2019-09-17 NOTE — ASSESSMENT
[FreeTextEntry1] : The patient's discomfort sounds like nerve irritation. I put the patient on Meloxicam for 2 weeks. He will followup with Dr. Massey

## 2019-09-17 NOTE — HISTORY OF PRESENT ILLNESS
[de-identified] : Germain is a 58 y/o male here for post-operative visit. 7/9/19- laparoscopic preperitoneal repair of bilateral inguinal hernias with ProGrip mesh, left side is direct and  right side is indirect by Dr. Massey. Last seen on 7/22/19. Today, patient reports pain at incision site.

## 2019-10-07 ENCOUNTER — APPOINTMENT (OUTPATIENT)
Dept: SURGERY | Facility: CLINIC | Age: 59
End: 2019-10-07

## 2019-10-29 NOTE — HISTORY OF PRESENT ILLNESS
Refill approved as requested.  
[de-identified] : Germain is a 60 y/o male s/p laparoscopic preperitoneal repair of bilateral inguinal hernias with ProGrip mesh, left side is direct and  right side is indirect.The patient is doing very well is back to his daily activities he had a postoperative visit to the emergency room 22 low-grade temperature he was sent home and has not had any problems since. He is tolerating a diet moving his bowels and urinating he has little to no pain

## 2020-03-02 ENCOUNTER — INPATIENT (INPATIENT)
Facility: HOSPITAL | Age: 60
LOS: 2 days | Discharge: HOME | DRG: 133 | End: 2020-03-05
Attending: GENERAL ACUTE CARE HOSPITAL | Admitting: GENERAL ACUTE CARE HOSPITAL
Payer: COMMERCIAL

## 2020-03-02 VITALS
OXYGEN SATURATION: 98 % | SYSTOLIC BLOOD PRESSURE: 128 MMHG | WEIGHT: 250 LBS | DIASTOLIC BLOOD PRESSURE: 79 MMHG | HEIGHT: 78 IN | HEART RATE: 120 BPM | RESPIRATION RATE: 16 BRPM | TEMPERATURE: 99 F

## 2020-03-02 DIAGNOSIS — Z98.49 CATARACT EXTRACTION STATUS, UNSPECIFIED EYE: Chronic | ICD-10-CM

## 2020-03-02 DIAGNOSIS — Z98.890 OTHER SPECIFIED POSTPROCEDURAL STATES: Chronic | ICD-10-CM

## 2020-03-02 DIAGNOSIS — R51 HEADACHE: ICD-10-CM

## 2020-03-02 LAB
ALBUMIN SERPL ELPH-MCNC: 3.9 G/DL — SIGNIFICANT CHANGE UP (ref 3.3–5)
ALP SERPL-CCNC: 88 U/L — SIGNIFICANT CHANGE UP (ref 40–120)
ALT FLD-CCNC: 19 U/L — SIGNIFICANT CHANGE UP (ref 10–45)
ANION GAP SERPL CALC-SCNC: 16 MMOL/L — SIGNIFICANT CHANGE UP (ref 5–17)
APTT BLD: 32.4 SEC — SIGNIFICANT CHANGE UP (ref 27.5–36.3)
AST SERPL-CCNC: 15 U/L — SIGNIFICANT CHANGE UP (ref 10–40)
BASOPHILS # BLD AUTO: 0.02 K/UL — SIGNIFICANT CHANGE UP (ref 0–0.2)
BASOPHILS NFR BLD AUTO: 0.2 % — SIGNIFICANT CHANGE UP (ref 0–2)
BILIRUB SERPL-MCNC: 0.7 MG/DL — SIGNIFICANT CHANGE UP (ref 0.2–1.2)
BUN SERPL-MCNC: 8 MG/DL — SIGNIFICANT CHANGE UP (ref 7–23)
CALCIUM SERPL-MCNC: 9.5 MG/DL — SIGNIFICANT CHANGE UP (ref 8.4–10.5)
CHLORIDE SERPL-SCNC: 99 MMOL/L — SIGNIFICANT CHANGE UP (ref 96–108)
CO2 SERPL-SCNC: 18 MMOL/L — LOW (ref 22–31)
CREAT SERPL-MCNC: 0.63 MG/DL — SIGNIFICANT CHANGE UP (ref 0.5–1.3)
EOSINOPHIL # BLD AUTO: 0.03 K/UL — SIGNIFICANT CHANGE UP (ref 0–0.5)
EOSINOPHIL NFR BLD AUTO: 0.3 % — SIGNIFICANT CHANGE UP (ref 0–6)
GLUCOSE SERPL-MCNC: 103 MG/DL — HIGH (ref 70–99)
HCT VFR BLD CALC: 43.3 % — SIGNIFICANT CHANGE UP (ref 39–50)
HGB BLD-MCNC: 13.9 G/DL — SIGNIFICANT CHANGE UP (ref 13–17)
IMM GRANULOCYTES NFR BLD AUTO: 0.4 % — SIGNIFICANT CHANGE UP (ref 0–1.5)
INR BLD: 1.13 RATIO — SIGNIFICANT CHANGE UP (ref 0.88–1.16)
LYMPHOCYTES # BLD AUTO: 0.94 K/UL — LOW (ref 1–3.3)
LYMPHOCYTES # BLD AUTO: 9.2 % — LOW (ref 13–44)
MCHC RBC-ENTMCNC: 27.5 PG — SIGNIFICANT CHANGE UP (ref 27–34)
MCHC RBC-ENTMCNC: 32.1 GM/DL — SIGNIFICANT CHANGE UP (ref 32–36)
MCV RBC AUTO: 85.7 FL — SIGNIFICANT CHANGE UP (ref 80–100)
MONOCYTES # BLD AUTO: 0.96 K/UL — HIGH (ref 0–0.9)
MONOCYTES NFR BLD AUTO: 9.4 % — SIGNIFICANT CHANGE UP (ref 2–14)
NEUTROPHILS # BLD AUTO: 8.23 K/UL — HIGH (ref 1.8–7.4)
NEUTROPHILS NFR BLD AUTO: 80.5 % — HIGH (ref 43–77)
NRBC # BLD: 0 /100 WBCS — SIGNIFICANT CHANGE UP (ref 0–0)
PLATELET # BLD AUTO: 199 K/UL — SIGNIFICANT CHANGE UP (ref 150–400)
POTASSIUM SERPL-MCNC: 4.2 MMOL/L — SIGNIFICANT CHANGE UP (ref 3.5–5.3)
POTASSIUM SERPL-SCNC: 4.2 MMOL/L — SIGNIFICANT CHANGE UP (ref 3.5–5.3)
PROT SERPL-MCNC: 7.8 G/DL — SIGNIFICANT CHANGE UP (ref 6–8.3)
PROTHROM AB SERPL-ACNC: 13.1 SEC — HIGH (ref 10–12.9)
RBC # BLD: 5.05 M/UL — SIGNIFICANT CHANGE UP (ref 4.2–5.8)
RBC # FLD: 12.1 % — SIGNIFICANT CHANGE UP (ref 10.3–14.5)
SODIUM SERPL-SCNC: 133 MMOL/L — LOW (ref 135–145)
WBC # BLD: 10.22 K/UL — SIGNIFICANT CHANGE UP (ref 3.8–10.5)
WBC # FLD AUTO: 10.22 K/UL — SIGNIFICANT CHANGE UP (ref 3.8–10.5)

## 2020-03-02 PROCEDURE — 70496 CT ANGIOGRAPHY HEAD: CPT | Mod: 26

## 2020-03-02 PROCEDURE — 99232 SBSQ HOSP IP/OBS MODERATE 35: CPT

## 2020-03-02 PROCEDURE — 99285 EMERGENCY DEPT VISIT HI MDM: CPT

## 2020-03-02 RX ORDER — SODIUM CHLORIDE 9 MG/ML
1000 INJECTION INTRAMUSCULAR; INTRAVENOUS; SUBCUTANEOUS ONCE
Refills: 0 | Status: COMPLETED | OUTPATIENT
Start: 2020-03-02 | End: 2020-03-02

## 2020-03-02 RX ORDER — ONDANSETRON 8 MG/1
4 TABLET, FILM COATED ORAL ONCE
Refills: 0 | Status: COMPLETED | OUTPATIENT
Start: 2020-03-02 | End: 2020-03-02

## 2020-03-02 RX ORDER — KETOROLAC TROMETHAMINE 30 MG/ML
15 SYRINGE (ML) INJECTION ONCE
Refills: 0 | Status: DISCONTINUED | OUTPATIENT
Start: 2020-03-02 | End: 2020-03-02

## 2020-03-02 RX ORDER — ACETAMINOPHEN 500 MG
975 TABLET ORAL ONCE
Refills: 0 | Status: COMPLETED | OUTPATIENT
Start: 2020-03-02 | End: 2020-03-02

## 2020-03-02 RX ORDER — ONDANSETRON 8 MG/1
4 TABLET, FILM COATED ORAL ONCE
Refills: 0 | Status: DISCONTINUED | OUTPATIENT
Start: 2020-03-02 | End: 2020-03-02

## 2020-03-02 RX ORDER — IBUPROFEN 200 MG
800 TABLET ORAL THREE TIMES A DAY
Refills: 0 | Status: DISCONTINUED | OUTPATIENT
Start: 2020-03-02 | End: 2020-03-04

## 2020-03-02 RX ORDER — METOCLOPRAMIDE HCL 10 MG
10 TABLET ORAL ONCE
Refills: 0 | Status: COMPLETED | OUTPATIENT
Start: 2020-03-02 | End: 2020-03-02

## 2020-03-02 RX ORDER — PANTOPRAZOLE SODIUM 20 MG/1
40 TABLET, DELAYED RELEASE ORAL
Refills: 0 | Status: DISCONTINUED | OUTPATIENT
Start: 2020-03-02 | End: 2020-03-05

## 2020-03-02 RX ADMIN — Medication 800 MILLIGRAM(S): at 23:01

## 2020-03-02 RX ADMIN — Medication 15 MILLIGRAM(S): at 20:46

## 2020-03-02 RX ADMIN — Medication 10 MILLIGRAM(S): at 18:45

## 2020-03-02 RX ADMIN — SODIUM CHLORIDE 1000 MILLILITER(S): 9 INJECTION INTRAMUSCULAR; INTRAVENOUS; SUBCUTANEOUS at 18:46

## 2020-03-02 RX ADMIN — Medication 15 MILLIGRAM(S): at 20:14

## 2020-03-02 RX ADMIN — Medication 975 MILLIGRAM(S): at 18:47

## 2020-03-02 RX ADMIN — SODIUM CHLORIDE 1000 MILLILITER(S): 9 INJECTION INTRAMUSCULAR; INTRAVENOUS; SUBCUTANEOUS at 20:46

## 2020-03-02 RX ADMIN — ONDANSETRON 4 MILLIGRAM(S): 8 TABLET, FILM COATED ORAL at 19:10

## 2020-03-02 RX ADMIN — Medication 975 MILLIGRAM(S): at 15:45

## 2020-03-02 NOTE — ED PROVIDER NOTE - RAPID ASSESSMENT
59y M with PMHx of KARLA, Fatty Liver presents to ED c/o circumferential HA with left orbital swelling and photophobia x 1 week. Reports minimal discharge from left eye this morning. Unable to sleep due to pain. PCP (Dr. Cuenca) sent pt to ED, suspected pt's sx are due to sinusitis, and prescribed Mucinex for sx relief. Reports prior hx of migraines. NKDA. Denies taking any meds for pain relief. Denies cough, fever, or rash. Rx: MVI only. Occupation: [employer].    **Pt seen in waiting room by Meredith TOMLINSON), documentation completed by Ingrid Olivia. Pt to be sent to main ED for further evaluation sooner- all orders placed to be followed by MD in the main ED**

## 2020-03-02 NOTE — H&P ADULT - ASSESSMENT
60 y/o male with no significant pmh , one episode of migraine HA years ago ( not similar to current presnetation) sent for evaluation for persistent LAMBERT from 's office.  Reports frontal HA, constant with no N/V/Fever or chills / no rashes.. HA is at times throbbing but overall dull and constant.. worse upon changing position as he lays on R but worse as he lays on L and much worse the first few min when he stands.. no nuchal rigidindity or neck pain   no ear pain .. associated photophobia hwoever seems to have that at baseline though seems to be worse now.   motrin helped somewhat yesterday with pain...   in addition to his HA he noted L eye pain ( though mild )  discharge from eye ( thicker than tears ) noted today.  in ED vitally stable afeb   CT : neg for vein thrombosis .   moderate elevation of eSR   mild hyponatremia 133     - HA : with photophobia but no fever   eye exam as noted above   will admit   ophtho consult and consider CT orbits   neuro consult ( will call in AM )   NSAIDS for pain   consider sumitriptan though presentation is not quiet typical for cluster migraine 58 y/o male with no significant pmh , one episode of migraine HA years ago ( not similar to current presnetation) sent for evaluation for persistent LAMBERT from 's office.  Reports frontal HA, constant with no N/V/Fever or chills / no rashes.. HA is at times throbbing but overall dull and constant.. worse upon changing position as he lays on R but worse as he lays on L and much worse the first few min when he stands.. no nuchal rigidindity or neck pain   no ear pain .. associated photophobia hwoever seems to have that at baseline though seems to be worse now.   motrin helped somewhat yesterday with pain...   in addition to his HA he noted L eye pain ( though mild )  discharge from eye ( thicker than tears ) noted today.  in ED vitally stable afeb   CT : neg for vein thrombosis .   moderate elevation of eSR   mild hyponatremia 133     - HA : with photophobia but no fever   eye exam as noted above   will admit   ophtho consult and consider CT orbits   neuro consult ( will call in AM )   NSAIDS for pain   consider sumitriptan though presentation is not quiet typical for cluster migraine     - hyponatremia : mild   monitor   laya pain induced SAIDH

## 2020-03-02 NOTE — H&P ADULT - NSICDXPASTMEDICALHX_GEN_ALL_CORE_FT
PAST MEDICAL HISTORY:  Bilateral inguinal hernia     Deviated nasal septum '99   surgically treated    Fatty liver     History of cataract 6/2019   Right: surgically treated    KARLA (obstructive sleep apnea) per criteria    Umbilical hernia ' 2016  surgically treated

## 2020-03-02 NOTE — ED CLERICAL - NS ED CLERK NOTE PRE-ARRIVAL INFORMATION; ADDITIONAL PRE-ARRIVAL INFORMATION
CC/Reason For referral:  2/25 patient was seen for headache and congestion.  today presents with vertigo, left eye swollen, tender scalp, congestion and severe headache  Preferred Consultant(if applicable):  Who admits for you (if needed):  Do you have documents you would like to fax over? no  Would you still like to speak to an ED attending?  please call after patient is seen

## 2020-03-02 NOTE — ED ADULT NURSE NOTE - OBJECTIVE STATEMENT
pt has headache for  over a 1 week and had  more pain on the left and back  side of the head  pt has photophobia Pt thought he had a sinus headache has gotten worse taking mucinex getting worse.  Pt was q doc labs sent and ivl placed pt received Tylenol and felt  b better after the medication

## 2020-03-02 NOTE — ED PROVIDER NOTE - PHYSICAL EXAMINATION
Gen: Uncomfortable appearing, AOx3, able to make needs known, non-toxic  Head: NCAT. tender to palpation L temporal region  HEENT: EOMI, oral mucosa moist, normal conjunctiva on R. mildly injected conjunctiva on L. R eye pressure: 13, 14, 14. L eye pressure: 14, 14, 15  Lung: CTAB, no respiratory distress, no wheezes/rhonchi/rales B/L, speaking in full sentences  CV: RRR, no murmurs  Abd: soft, NTND, no guarding  MSK: no visible deformities  Neuro: No focal sensory or motor deficits. CN 2-12 grossly intact b/l  Skin: Warm, well perfused  Psych: normal affect

## 2020-03-02 NOTE — ED PROVIDER NOTE - ATTENDING CONTRIBUTION TO CARE
59 YOM PMH cataract in right eye here with 1 week of worsening headache. Gradual in onset, bilateral now with worsening left facial pain a/w left eye tearing and scalp sensitivity and photosensitivity. Saw his PCP for same problem and rx'd mucinex for decongestant. Denies h/o of headache. Denies trauma. Denies f/c, neck pain, change in vision, n/v, cp, sob, abd pain, numbness, tingling.   AP - CT to eval for sinus thrombosis/mass/iCH. IOP 14 OS and OD. concern for GCA. neuro consult. labs/esr/crp. pain control. steroids. admit.

## 2020-03-02 NOTE — ED PROVIDER NOTE - PROGRESS NOTE DETAILS
andrey will see the pt in the morning. made medicine PA aware of neuro recs to give prednisone and consult rheum for possible GCA.

## 2020-03-02 NOTE — H&P ADULT - EYES COMMENTS
L eye mild ijection of conjuctiva medically otherwise clear , PEARLA .. pain upon movement of eye exescially horizentally to either extreme..limtied visual field medically , superiorily and inferiiorily

## 2020-03-02 NOTE — ED PROVIDER NOTE - CLINICAL SUMMARY MEDICAL DECISION MAKING FREE TEXT BOX
58 y/o M w/ denies sig PMH presenting w/ headache. Given distribution possible trigeminal neuralgia. Poss migraine. W/ L sided temporal tenderness will send ESR to eval for signs of temporal arteritis. W/ eye involvement and L sided headache, will eval for venous sinus thrombosis. Possibly due to sinusitis as well. Plan for labs, meds, imaging. Will reassess the need for additional interventions as clinically warranted.

## 2020-03-02 NOTE — ED PROVIDER NOTE - OBJECTIVE STATEMENT
58 y/o M who denies sig PMH presenting w/ a complaint of headache. Gold lopez 4. Presents w/ wife. Reports for the past week has had intermittent L sided headache, radiating around his head. Worsened over the past few days. Associated now w/ L eye pain. Denies blurry vision or changes in vision at all. Saw his PCP who believed he was having sinusitis and recommended decongestants. Pt still having symptoms despite taking that. Took tylenol for the pain yesterday which provided some relief. Denies hx of similar pain in the past. Describes the pain as sharp. Starts on L side and wraps around head. Noticed eye lid swelling as well. Denies fevers, chills, dizziness, blurred vision, chest pain, cough, shortness of breath, abdominal pain, n/v/d/c, urinary symptoms, MSK pain, rash.

## 2020-03-02 NOTE — ED ADULT NURSE REASSESSMENT NOTE - NS ED NURSE REASSESS COMMENT FT1
Patient seen by Efra Herron - left top of hand 20G IVL inserted and blood work sent to lab as ordered.

## 2020-03-02 NOTE — H&P ADULT - HISTORY OF PRESENT ILLNESS
60 y/o male with no significant pmh , one episode of migraine HA years ago ( not similar to current presnetation) sent for evaluation for persistent LAMBERT from 's office.  Reports frontal HA, constant with no N/V/Fever or chills / no rashes.. HA is at times throbbing but overall dull and constant.. worse upon changing position as he lays on R but worse as he lays on L and much worse the first few min when he stands.. no nuchal rigidindity or neck pain   no ear pain .. associated photophobia hwoever seems to have that at baseline though seems to be worse now.   motrin helped somewhat yesterday with pain...   in addition to his HA he noted L eye pain ( though mild )  discharge from eye ( thicker than tears ) noted today.  he had recieved mucinex for possible sinusitis hwoever with no help over the past week   in ED vitally stable afeb   CT : neg for vein thrombosis .   moderate elevation of eSR   mild hyponatremia 133

## 2020-03-03 DIAGNOSIS — J32.9 CHRONIC SINUSITIS, UNSPECIFIED: ICD-10-CM

## 2020-03-03 LAB
ALBUMIN SERPL ELPH-MCNC: 3.7 G/DL — SIGNIFICANT CHANGE UP (ref 3.3–5)
ALP SERPL-CCNC: 76 U/L — SIGNIFICANT CHANGE UP (ref 40–120)
ALT FLD-CCNC: 18 U/L — SIGNIFICANT CHANGE UP (ref 10–45)
ANION GAP SERPL CALC-SCNC: 13 MMOL/L — SIGNIFICANT CHANGE UP (ref 5–17)
AST SERPL-CCNC: 12 U/L — SIGNIFICANT CHANGE UP (ref 10–40)
BASOPHILS # BLD AUTO: 0.04 K/UL — SIGNIFICANT CHANGE UP (ref 0–0.2)
BASOPHILS NFR BLD AUTO: 0.5 % — SIGNIFICANT CHANGE UP (ref 0–2)
BILIRUB SERPL-MCNC: 0.6 MG/DL — SIGNIFICANT CHANGE UP (ref 0.2–1.2)
BUN SERPL-MCNC: 11 MG/DL — SIGNIFICANT CHANGE UP (ref 7–23)
CALCIUM SERPL-MCNC: 9.6 MG/DL — SIGNIFICANT CHANGE UP (ref 8.4–10.5)
CHLORIDE SERPL-SCNC: 104 MMOL/L — SIGNIFICANT CHANGE UP (ref 96–108)
CO2 SERPL-SCNC: 23 MMOL/L — SIGNIFICANT CHANGE UP (ref 22–31)
CREAT SERPL-MCNC: 0.7 MG/DL — SIGNIFICANT CHANGE UP (ref 0.5–1.3)
CRP SERPL-MCNC: 5.6 MG/DL — HIGH (ref 0–0.4)
EOSINOPHIL # BLD AUTO: 0.29 K/UL — SIGNIFICANT CHANGE UP (ref 0–0.5)
EOSINOPHIL NFR BLD AUTO: 3.8 % — SIGNIFICANT CHANGE UP (ref 0–6)
GLUCOSE SERPL-MCNC: 95 MG/DL — SIGNIFICANT CHANGE UP (ref 70–99)
HCT VFR BLD CALC: 41.4 % — SIGNIFICANT CHANGE UP (ref 39–50)
HCV AB S/CO SERPL IA: 0.18 S/CO — SIGNIFICANT CHANGE UP (ref 0–0.99)
HCV AB SERPL-IMP: SIGNIFICANT CHANGE UP
HGB BLD-MCNC: 13.4 G/DL — SIGNIFICANT CHANGE UP (ref 13–17)
IMM GRANULOCYTES NFR BLD AUTO: 0.4 % — SIGNIFICANT CHANGE UP (ref 0–1.5)
LYMPHOCYTES # BLD AUTO: 1.16 K/UL — SIGNIFICANT CHANGE UP (ref 1–3.3)
LYMPHOCYTES # BLD AUTO: 15.3 % — SIGNIFICANT CHANGE UP (ref 13–44)
MCHC RBC-ENTMCNC: 28 PG — SIGNIFICANT CHANGE UP (ref 27–34)
MCHC RBC-ENTMCNC: 32.4 GM/DL — SIGNIFICANT CHANGE UP (ref 32–36)
MCV RBC AUTO: 86.6 FL — SIGNIFICANT CHANGE UP (ref 80–100)
MONOCYTES # BLD AUTO: 1 K/UL — HIGH (ref 0–0.9)
MONOCYTES NFR BLD AUTO: 13.2 % — SIGNIFICANT CHANGE UP (ref 2–14)
NEUTROPHILS # BLD AUTO: 5.07 K/UL — SIGNIFICANT CHANGE UP (ref 1.8–7.4)
NEUTROPHILS NFR BLD AUTO: 66.8 % — SIGNIFICANT CHANGE UP (ref 43–77)
NRBC # BLD: 0 /100 WBCS — SIGNIFICANT CHANGE UP (ref 0–0)
PLATELET # BLD AUTO: 175 K/UL — SIGNIFICANT CHANGE UP (ref 150–400)
POTASSIUM SERPL-MCNC: 3.9 MMOL/L — SIGNIFICANT CHANGE UP (ref 3.5–5.3)
POTASSIUM SERPL-SCNC: 3.9 MMOL/L — SIGNIFICANT CHANGE UP (ref 3.5–5.3)
PROT SERPL-MCNC: 7.2 G/DL — SIGNIFICANT CHANGE UP (ref 6–8.3)
RBC # BLD: 4.78 M/UL — SIGNIFICANT CHANGE UP (ref 4.2–5.8)
RBC # FLD: 12.2 % — SIGNIFICANT CHANGE UP (ref 10.3–14.5)
SODIUM SERPL-SCNC: 140 MMOL/L — SIGNIFICANT CHANGE UP (ref 135–145)
WBC # BLD: 7.59 K/UL — SIGNIFICANT CHANGE UP (ref 3.8–10.5)
WBC # FLD AUTO: 7.59 K/UL — SIGNIFICANT CHANGE UP (ref 3.8–10.5)

## 2020-03-03 PROCEDURE — 31231 NASAL ENDOSCOPY DX: CPT

## 2020-03-03 PROCEDURE — 99233 SBSQ HOSP IP/OBS HIGH 50: CPT | Mod: GC

## 2020-03-03 PROCEDURE — 99222 1ST HOSP IP/OBS MODERATE 55: CPT | Mod: 25

## 2020-03-03 PROCEDURE — 99222 1ST HOSP IP/OBS MODERATE 55: CPT

## 2020-03-03 RX ORDER — FLUTICASONE PROPIONATE 50 MCG
1 SPRAY, SUSPENSION NASAL
Refills: 0 | Status: DISCONTINUED | OUTPATIENT
Start: 2020-03-03 | End: 2020-03-05

## 2020-03-03 RX ORDER — OXYMETAZOLINE HYDROCHLORIDE 0.5 MG/ML
1 SPRAY NASAL
Refills: 0 | Status: DISCONTINUED | OUTPATIENT
Start: 2020-03-03 | End: 2020-03-05

## 2020-03-03 RX ORDER — AMPICILLIN SODIUM AND SULBACTAM SODIUM 250; 125 MG/ML; MG/ML
3 INJECTION, POWDER, FOR SUSPENSION INTRAMUSCULAR; INTRAVENOUS ONCE
Refills: 0 | Status: COMPLETED | OUTPATIENT
Start: 2020-03-03 | End: 2020-03-03

## 2020-03-03 RX ORDER — AMPICILLIN SODIUM AND SULBACTAM SODIUM 250; 125 MG/ML; MG/ML
3 INJECTION, POWDER, FOR SUSPENSION INTRAMUSCULAR; INTRAVENOUS EVERY 6 HOURS
Refills: 0 | Status: DISCONTINUED | OUTPATIENT
Start: 2020-03-04 | End: 2020-03-05

## 2020-03-03 RX ORDER — ACETAMINOPHEN 500 MG
1000 TABLET ORAL ONCE
Refills: 0 | Status: COMPLETED | OUTPATIENT
Start: 2020-03-03 | End: 2020-03-03

## 2020-03-03 RX ORDER — SODIUM CHLORIDE 0.65 %
2 AEROSOL, SPRAY (ML) NASAL THREE TIMES A DAY
Refills: 0 | Status: DISCONTINUED | OUTPATIENT
Start: 2020-03-03 | End: 2020-03-05

## 2020-03-03 RX ORDER — AMPICILLIN SODIUM AND SULBACTAM SODIUM 250; 125 MG/ML; MG/ML
INJECTION, POWDER, FOR SUSPENSION INTRAMUSCULAR; INTRAVENOUS
Refills: 0 | Status: DISCONTINUED | OUTPATIENT
Start: 2020-03-03 | End: 2020-03-05

## 2020-03-03 RX ADMIN — OXYMETAZOLINE HYDROCHLORIDE 1 SPRAY(S): 0.5 SPRAY NASAL at 23:16

## 2020-03-03 RX ADMIN — Medication 800 MILLIGRAM(S): at 12:45

## 2020-03-03 RX ADMIN — AMPICILLIN SODIUM AND SULBACTAM SODIUM 200 GRAM(S): 250; 125 INJECTION, POWDER, FOR SUSPENSION INTRAMUSCULAR; INTRAVENOUS at 18:46

## 2020-03-03 RX ADMIN — Medication 1 SPRAY(S): at 23:16

## 2020-03-03 RX ADMIN — Medication 800 MILLIGRAM(S): at 00:00

## 2020-03-03 RX ADMIN — AMPICILLIN SODIUM AND SULBACTAM SODIUM 200 GRAM(S): 250; 125 INJECTION, POWDER, FOR SUSPENSION INTRAMUSCULAR; INTRAVENOUS at 23:15

## 2020-03-03 RX ADMIN — Medication 800 MILLIGRAM(S): at 11:45

## 2020-03-03 RX ADMIN — PANTOPRAZOLE SODIUM 40 MILLIGRAM(S): 20 TABLET, DELAYED RELEASE ORAL at 05:17

## 2020-03-03 RX ADMIN — Medication 800 MILLIGRAM(S): at 06:00

## 2020-03-03 RX ADMIN — Medication 800 MILLIGRAM(S): at 23:15

## 2020-03-03 RX ADMIN — Medication 2 SPRAY(S): at 23:15

## 2020-03-03 RX ADMIN — Medication 400 MILLIGRAM(S): at 00:54

## 2020-03-03 RX ADMIN — Medication 50 MILLIGRAM(S): at 18:15

## 2020-03-03 RX ADMIN — Medication 800 MILLIGRAM(S): at 05:17

## 2020-03-03 RX ADMIN — Medication 60 MILLIGRAM(S): at 10:46

## 2020-03-03 RX ADMIN — Medication 1000 MILLIGRAM(S): at 01:10

## 2020-03-03 NOTE — CONSULT NOTE ADULT - SUBJECTIVE AND OBJECTIVE BOX
LUIS ALBERTO [facility]  [id number]    HISTORY OF PRESENT ILLNESS:    HPI: 59y R-handed man with a PMH of KARLA, cataracts s/p cataract surgery, nasal septum deviation s/p repair who presented on 03-02-20 with severe HA. HA first started 7 days ago and was gradual in onset.  He describes the HA as alternating b/w throbbing and dull bifrontal 4-8/10.  PT admits associated photophobia, tearing, congestion, and left periorbital swelling.  Denies phonophobia, visual acuity changes, loss of vision.  Though he notes no vision changes, he states he has chronic visual issues due to cataracts.  Denies jaw claudication, tinnitus, hearing loss, hearing changes, N/V, confusion, dizziness, light-headedness.  The HA does change with position worsening when lying on either side (L>R) and after prolonged standing.  He does not have a prior HA history.      PAST MEDICAL & SURGICAL HISTORY:  KARLA (obstructive sleep apnea): per criteria  History of cataract: 6/2019   Right: surgically treated  Deviated nasal septum: &#x27;99   surgically treated  Bilateral inguinal hernia  Fatty liver  Umbilical hernia: &#x27; 2016  surgically treated  S/P cataract extraction: 6/2019   Right  S/P colonoscopy: &#x27; 16  Negative  History of umbilical hernia repair: &#x27; 2016  with Mesh  H/O nasal septoplasty: &#x27;99      Review of Systems:  Gen:  No fevers/chills, weight loss  HEENT: No blurry vision, no difficulty swallowing, no oral or nasal ulcers  CVS: No chest pain/palpitations  Resp: No SOB/wheezing  GI: No N/V/C/D/abdominal pain  MSK:  Skin: No new rashes  Neuro: No headaches    MEDICATIONS  (STANDING):  amoxicillin  875 milliGRAM(s)/clavulanate 1 Tablet(s) Oral two times a day  ibuprofen  Tablet. 800 milliGRAM(s) Oral three times a day  pantoprazole    Tablet 40 milliGRAM(s) Oral two times a day    MEDICATIONS  (PRN):      Allergies    No Known Allergies    Intolerances        PERTINENT MEDICATION HISTORY:    SOCIAL HISTORY:  OCCUPATION:  TRAVEL HISTORY:    FAMILY HISTORY:  Family history of metastatic neoplastic disease: Mother      Vital Signs Last 24 Hrs  T(C): 36.7 (03 Mar 2020 04:41), Max: 37.5 (02 Mar 2020 17:03)  T(F): 98 (03 Mar 2020 04:41), Max: 99.5 (02 Mar 2020 17:03)  HR: 114 (03 Mar 2020 04:41) (85 - 125)  BP: 144/81 (03 Mar 2020 04:41) (128/79 - 144/81)  BP(mean): --  RR: 16 (03 Mar 2020 04:41) (16 - 18)  SpO2: 96% (03 Mar 2020 04:41) (96% - 99%)    Physical Exam:  General: No apparent distress  HEENT: EOMI, MMM  CVS: +S1/S2, RRR, no murmurs/rubs/gallops  Resp: CTA b/l. No crackles/wheezing  GI: Soft, NT/ND +BS  MSK:  Neuro: AAOx3  Skin: no visible rashes    LABS:                        13.4   7.59  )-----------( 175      ( 03 Mar 2020 07:09 )             41.4     03-03    140  |  104  |  11  ----------------------------<  95  3.9   |  23  |  0.70    Ca    9.6      03 Mar 2020 07:02    TPro  7.2  /  Alb  3.7  /  TBili  0.6  /  DBili  x   /  AST  12  /  ALT  18  /  AlkPhos  76  03-03    PT/INR - ( 02 Mar 2020 15:49 )   PT: 13.1 sec;   INR: 1.13 ratio         PTT - ( 02 Mar 2020 15:49 )  PTT:32.4 sec      Sedimentation Rate, Erythrocyte: 45 mm/hr (03.02.20 @ 15:49)    C-Reactive Protein, Serum: 5.60 mg/dL (03.02.20 @ 23:50)        RADIOLOGY & ADDITIONAL STUDIES:    < from: CT Angio Head w/ IV Cont (03.02.20 @ 18:15) >  CT BRAIN:    There is no hydrocephalus, mass effect, midline shift, vasogenic edema, or acute intracranial hemorrhage.  There is no CT evidence of acute territorial infarct.      The visualized mastoid air cells are clear. Air-fluid levels in the bilateral maxillary and ethmoid sinuses. Bilateral axillary and ethmoid sinus mucosal thickening. Complete opacification of the sphenoid sinus.    CT VENOGRAM BRAIN:    The superior sagittal, straight, bilateral transverse and sigmoid sinuses are patent. The vein of Benitez, internal cerebral veins, and basal veins of Eder are patent.    The cortical veins enhance in a symmetric fashion.    IMPRESSION:     CT head:    No acute intracranial hemorrhage, mass effect, vasogenic edema, or evidence of acute territorial infarct.    Air-fluid levels in the bilateral maxillary and ethmoid sinuses. Complete opacificationof the sphenoid sinus. Correlate for the presence of acute sinusitis.    CTV brain:    No evidence for venous sinus or cortical vein thrombosis.      < end of copied text > LUIS ALBERTO [facility]  [id number]    HISTORY OF PRESENT ILLNESS:      59y man with a PMH of KARLA, cataracts s/p cataract surgery, nasal septum deviation s/p repair who presented on 03-02-20 with severe HA x 7-10 days. He describes the HA as alternating b/w throbbing and dull bifrontal 4-8/10.  PT admits associated photophobia, tearing, congestion, and left periorbital swelling.  Denies phonophobia, visual acuity changes, loss of vision.  Though he notes no vision changes, he states he has chronic visual issues due to cataracts.  Denies jaw claudication, tinnitus, hearing loss, hearing changes, N/V, confusion, dizziness, light-headedness.  The HA does change with position worsening when lying on either side (L>R) and after prolonged standing.  He does not have a prior HA history.    Pt added post nasal drip prior to headache started, reports headache  initially was band like bifrontal, however now localized over left parietal area a/w severe scalp and temporal tenderness. Jimmy any fever, jaw claudication, vision disturbance, skin lesion, shoulder or pelvic stiffness or pain. Endorses intermittent dysphagia with taking pills and food with no episode of choking. denies any hoarseness     PAST MEDICAL & SURGICAL HISTORY:  KARLA (obstructive sleep apnea): per criteria  History of cataract: 6/2019   Right: surgically treated  Deviated nasal septum: &#x27;99   surgically treated  Bilateral inguinal hernia  Fatty liver  Umbilical hernia: &#x27; 2016  surgically treated  S/P cataract extraction: 6/2019   Right  S/P colonoscopy: &#x27; 16  Negative  History of umbilical hernia repair: &#x27; 2016  with Mesh  H/O nasal septoplasty: &#x27;99      Review of Systems:  Gen:  No fevers/chills, weight loss  HEENT: No blurry vision, rest as HPI  CVS: No chest pain/palpitations  Resp: No SOB/wheezing  GI: No N/V/C/D/abdominal pain  MSK: No joint pain   Skin: No new rashes  Neuro: as HPI  MEDICATIONS  (STANDING):  amoxicillin  875 milliGRAM(s)/clavulanate 1 Tablet(s) Oral two times a day  ibuprofen  Tablet. 800 milliGRAM(s) Oral three times a day  pantoprazole    Tablet 40 milliGRAM(s) Oral two times a day    MEDICATIONS  (PRN):      Allergies    No Known Allergies    Intolerances    FAMILY HISTORY:  Family history of metastatic neoplastic disease: Mother      Vital Signs Last 24 Hrs  T(C): 36.7 (03 Mar 2020 04:41), Max: 37.5 (02 Mar 2020 17:03)  T(F): 98 (03 Mar 2020 04:41), Max: 99.5 (02 Mar 2020 17:03)  HR: 114 (03 Mar 2020 04:41) (85 - 125)  BP: 144/81 (03 Mar 2020 04:41) (128/79 - 144/81)  BP(mean): --  RR: 16 (03 Mar 2020 04:41) (16 - 18)  SpO2: 96% (03 Mar 2020 04:41) (96% - 99%)    Physical Exam:  General: No apparent distress  HEENT: scalp tenderness, left temporal tenderness. decreased left TA pulse compared to right side  CVS: +S1/S2, RRR, no murmurs/rubs/gallops  Resp: CTA b/l. No crackles/wheezing  GI: Soft, NT/ND +BS  MSK: no synovitis   Neuro: AAOx3  Skin: no visible rashes    LABS:                        13.4   7.59  )-----------( 175      ( 03 Mar 2020 07:09 )             41.4     03-03    140  |  104  |  11  ----------------------------<  95  3.9   |  23  |  0.70    Ca    9.6      03 Mar 2020 07:02    TPro  7.2  /  Alb  3.7  /  TBili  0.6  /  DBili  x   /  AST  12  /  ALT  18  /  AlkPhos  76  03-03    PT/INR - ( 02 Mar 2020 15:49 )   PT: 13.1 sec;   INR: 1.13 ratio         PTT - ( 02 Mar 2020 15:49 )  PTT:32.4 sec      Sedimentation Rate, Erythrocyte: 45 mm/hr (03.02.20 @ 15:49)    C-Reactive Protein, Serum: 5.60 mg/dL (03.02.20 @ 23:50)        RADIOLOGY & ADDITIONAL STUDIES:    < from: CT Angio Head w/ IV Cont (03.02.20 @ 18:15) >  CT BRAIN:    There is no hydrocephalus, mass effect, midline shift, vasogenic edema, or acute intracranial hemorrhage.  There is no CT evidence of acute territorial infarct.      The visualized mastoid air cells are clear. Air-fluid levels in the bilateral maxillary and ethmoid sinuses. Bilateral axillary and ethmoid sinus mucosal thickening. Complete opacification of the sphenoid sinus.    CT VENOGRAM BRAIN:    The superior sagittal, straight, bilateral transverse and sigmoid sinuses are patent. The vein of Benitez, internal cerebral veins, and basal veins of Eder are patent.    The cortical veins enhance in a symmetric fashion.    IMPRESSION:     CT head:    No acute intracranial hemorrhage, mass effect, vasogenic edema, or evidence of acute territorial infarct.    Air-fluid levels in the bilateral maxillary and ethmoid sinuses. Complete opacificationof the sphenoid sinus. Correlate for the presence of acute sinusitis.    CTV brain:    No evidence for venous sinus or cortical vein thrombosis.      < end of copied text > LUIS ALBERTO [facility]  [id number]    HISTORY OF PRESENT ILLNESS:      59y man with a PMH of KARLA, cataracts s/p cataract surgery, nasal septum deviation s/p repair who presented on 03-02-20 with severe HA x 7-10 days. He describes the HA as alternating b/w throbbing and dull bifrontal 4-8/10.  PT admits associated photophobia, tearing, congestion, and left periorbital swelling.  Denies phonophobia, visual acuity changes, loss of vision.  Though he notes no vision changes, he states he has chronic visual issues due to cataracts.  Denies jaw claudication, tinnitus, hearing loss, hearing changes, N/V, confusion, dizziness, light-headedness.  The HA does change with position worsening when lying on either side (L>R) and after prolonged standing.  He does not have a prior HA history.    Pt added post nasal drip prior to headache started, reports headache  initially was band like bifrontal, however now localized over left parietal area a/w severe scalp and temporal tenderness. Jimmy any fever, jaw claudication, vision disturbance, skin lesion, shoulder or pelvic stiffness or pain. Endorses intermittent dysphagia with taking pills and food with no episode of choking. denies any hoarseness     PAST MEDICAL & SURGICAL HISTORY:  KARLA (obstructive sleep apnea): per criteria  History of cataract: 6/2019   Right: surgically treated  Deviated nasal septum: &#x27;99   surgically treated  Bilateral inguinal hernia  Fatty liver  Umbilical hernia: &#x27; 2016  surgically treated  S/P cataract extraction: 6/2019   Right  S/P colonoscopy: &#x27; 16  Negative  History of umbilical hernia repair: &#x27; 2016  with Mesh  H/O nasal septoplasty: &#x27;99      Review of Systems:  Gen:  No fevers/chills, weight loss  HEENT: No blurry vision, rest as HPI  CVS: No chest pain/palpitations  Resp: No SOB/wheezing  GI: No N/V/C/D/abdominal pain  MSK: No joint pain   Skin: No new rashes  Neuro: as HPI  MEDICATIONS  (STANDING):  amoxicillin  875 milliGRAM(s)/clavulanate 1 Tablet(s) Oral two times a day  ibuprofen  Tablet. 800 milliGRAM(s) Oral three times a day  pantoprazole    Tablet 40 milliGRAM(s) Oral two times a day    MEDICATIONS  (PRN):      Allergies    No Known Allergies    Intolerances    FAMILY HISTORY:  Family history of metastatic neoplastic disease: Mother      Vital Signs Last 24 Hrs  T(C): 36.7 (03 Mar 2020 04:41), Max: 37.5 (02 Mar 2020 17:03)  T(F): 98 (03 Mar 2020 04:41), Max: 99.5 (02 Mar 2020 17:03)  HR: 114 (03 Mar 2020 04:41) (85 - 125)  BP: 144/81 (03 Mar 2020 04:41) (128/79 - 144/81)  BP(mean): --  RR: 16 (03 Mar 2020 04:41) (16 - 18)  SpO2: 96% (03 Mar 2020 04:41) (96% - 99%)    Physical Exam:  General: No apparent distress  HEENT: scalp tenderness, left temporal tenderness. decreased left TA pulse compared to right side  CVS: +S1/S2, RRR, no murmurs/rubs/gallops  Resp: CTA b/l. No crackles/wheezing  GI: Soft, NT/ND +BS  MSK: no synovitis   Neuro: AAOx3  Skin: no visible rashes    LABS:                        13.4   7.59  )-----------( 175      ( 03 Mar 2020 07:09 )             41.4     03-03    140  |  104  |  11  ----------------------------<  95  3.9   |  23  |  0.70    Ca    9.6      03 Mar 2020 07:02    TPro  7.2  /  Alb  3.7  /  TBili  0.6  /  DBili  x   /  AST  12  /  ALT  18  /  AlkPhos  76  03-03    PT/INR - ( 02 Mar 2020 15:49 )   PT: 13.1 sec;   INR: 1.13 ratio         PTT - ( 02 Mar 2020 15:49 )  PTT:32.4 sec      Sedimentation Rate, Erythrocyte: 45 mm/hr (03.02.20 @ 15:49)    C-Reactive Protein, Serum: 5.60 mg/dL (03.02.20 @ 23:50)        RADIOLOGY & ADDITIONAL STUDIES:    < from: CT Angio Head w/ IV Cont (03.02.20 @ 18:15) >  CT BRAIN:    There is no hydrocephalus, mass effect, midline shift, vasogenic edema, or acute intracranial hemorrhage.  There is no CT evidence of acute territorial infarct.      The visualized mastoid air cells are clear. Air-fluid levels in the bilateral maxillary and ethmoid sinuses. Bilateral axillary and ethmoid sinus mucosal thickening. Complete opacification of the sphenoid sinus.    CT VENOGRAM BRAIN:    The superior sagittal, straight, bilateral transverse and sigmoid sinuses are patent. The vein of Benitez, internal cerebral veins, and basal veins of Eder are patent.    The cortical veins enhance in a symmetric fashion.    IMPRESSION:     CT head:    No acute intracranial hemorrhage, mass effect, vasogenic edema, or evidence of acute territorial infarct.    Air-fluid levels in the bilateral maxillary and ethmoid sinuses. Complete opacificationof the sphenoid sinus. Correlate for the presence of acute sinusitis.    CTV brain:    No evidence for venous sinus or cortical vein thrombosis.      < end of copied text >

## 2020-03-03 NOTE — CONSULT NOTE ADULT - ASSESSMENT
59M otherwise healthy presents with frontal headache and left eye pain now with concern for GCA    - Plan for left temporal artery biopsy possible right on Thursday morning  - Please make patient NPO at midnight tomorrow and obtain preoperative labs (CBC, BMP, coagulation panel)  - Consent to be obtained tomorrow  - f/u final rheumatology recs    d/w Dr. Dominick Bell, PGY-2  Vascular Surgery  [phone] with questions

## 2020-03-03 NOTE — CONSULT NOTE ADULT - ASSESSMENT
INCOMPLETE  Assessment:  59y R-handed M with h/o KARLA, cataracts and deviated nasal septum p/w bifrontal throbbing HA worse with position change (worse on L. lat recum>R.lat recum) a/w some swelling of the L. periorbital region, conjunctival injection and tearing.     On exam, he has no focal tenderness to palpation of either temporal artery, has a right eye which skews laterally and vertically on up gaze (apparently chronic per PT since 1 yr) and is reporting no further HA.  OD exophoric on primary gaze intermittently (corrects to midline and then skews out again).  No other focal neurologic deficit.     ESR 45.  Visual acuity 20/30 OU.   CTV neg for venous sinus thrombosis.     IMPRESSION: Throbbing B/L Frontal HA a/w conjunctival injection, tearing, worsening on laying left with elevated ESR    DDx: GCA, Sinusitis, Cluster HA less likely given no prominent Ha history or Migraine    Plan  -Consider rheum consult for possible GCA  -No vision changes yet and no tenderness to palpation, but given risk of blindness from GCA untreated, would suggest one time dose of Prednisone 60 mg, especially if PT develops visual symptoms.  -PT has improved symptoms now and no longer c/o HA, if continues to endorse and has continued tearing of eyes, can try brief trial of O2 therapy to see if responds re: cluster LAMBERT.     Joey Moise, DO  PGY-2 Neurology Service INCOMPLETE  Assessment:  59y R-handed M with h/o KARLA, cataracts and deviated nasal septum p/w bifrontal throbbing HA worse with position change (worse on L. lat recum>R.lat recum) a/w some swelling of the L. periorbital region, conjunctival injection and tearing.     On exam, he has no focal tenderness to palpation of either temporal artery, has a right eye which skews laterally and vertically on up gaze (apparently chronic per PT since 1 yr) and is reporting no further HA.  OD exophoric on primary gaze intermittently (corrects to midline and then skews out again).  No other focal neurologic deficit.     ESR 45.  Visual acuity 20/30 OU.   CTV neg for venous sinus thrombosis.     IMPRESSION: Throbbing B/L Frontal HA a/w conjunctival injection, tearing, worsening on laying left with elevated ESR    DDx: GCA, Sinusitis, Cluster HA less likely given no prominent Ha history or Migraine    Plan  -Consider rheum consult for possible GCA  -No vision changes yet and no tenderness to palpation, but given risk of blindness from GCA untreated, would suggest one time dose of Prednisone 60 mg, especially if PT develops visual symptoms.  -PT has improved symptoms now and no longer c/o HA, if continues to endorse and has continued tearing of eyes, can try brief trial of O2 therapy to see if responds re: cluster HA.   -Sinusitis treatment per primary team.   -Given vertical skew of right eye (chronic), would consider MRI brain w/o contrast which can be done inpatient or outpatient to R/O CNS cause for vertical skew.     Joey Moise, DO  PGY-2 Neurology Service Assessment:  59y R-handed M with h/o KARLA, cataracts and deviated nasal septum p/w bifrontal throbbing HA worse with position change (worse on L. lat recum>R.lat recum) a/w some swelling of the L. periorbital region, conjunctival injection and tearing.     On exam, he has no focal tenderness to palpation of either temporal artery, has a right eye which skews laterally and vertically on up gaze (apparently chronic per PT since 1 yr) and is reporting no further HA.  OD exophoric on primary gaze intermittently (corrects to midline and then skews out again).  No other focal neurologic deficit.     ESR 45.  Visual acuity 20/30 OU.   CTV neg for venous sinus thrombosis.     IMPRESSION: Throbbing B/L Frontal HA a/w conjunctival injection, tearing, worsening on laying left with elevated ESR    DDx: GCA, Sinusitis, Cluster HA less likely given no prominent Ha history or Migraine    Plan  -Consider rheum consult for possible GCA  -No vision changes yet and no tenderness to palpation, but given risk of blindness from GCA untreated, would suggest one time dose of Prednisone 60 mg, especially if PT develops visual symptoms.  -PT has improved symptoms now and no longer c/o HA, if continues to endorse and has continued tearing of eyes, can try brief trial of O2 therapy to see if responds re: cluster HA.   -Sinusitis treatment per primary team.   -Given vertical skew of right eye (chronic), would consider MRI brain w/o contrast which can be done inpatient or outpatient to R/O CNS cause for vertical skew.     Case to be discussed with neurology team and neurology attending, Dr. Goldsmith, in AM.     Joey Moise, DO  PGY-2 Neurology Service

## 2020-03-03 NOTE — CONSULT NOTE ADULT - SUBJECTIVE AND OBJECTIVE BOX
General Surgery Consult  Consulting surgical team: Vascular Surgery  Consulting attending: Poncho Tan    HPI:  60 y/o male with no significant pmh , one episode of migraine HA years ago ( not similar to current presnetation) sent for evaluation for persistent LAMBERT from 's office.  Reports frontal HA, constant with no N/V/Fever or chills / no rashes.. HA is at times throbbing but overall dull and constant.. worse upon changing position as he lays on R but worse as he lays on L and much worse the first few min when he stands.. no nuchal rigidindity or neck pain   no ear pain .. associated photophobia hwoever seems to have that at baseline though seems to be worse now.   motrin helped somewhat yesterday with pain...   in addition to his HA he noted L eye pain ( though mild )  discharge from eye ( thicker than tears ) noted today.  he had recieved mucinex for possible sinusitis hwoever with no help over the past week   in ED vitally stable afeb   CT : neg for vein thrombosis .   moderate elevation of eSR   mild hyponatremia 133 (02 Mar 2020 21:04)    Concern for GCA following evaluation by ophthalmology and rheumatology. Vascular surgery consulted for left temporal artery biopsy.      PAST MEDICAL HISTORY:  KARLA (obstructive sleep apnea)  History of cataract  Deviated nasal septum  Bilateral inguinal hernia  Fatty liver  Umbilical hernia      PAST SURGICAL HISTORY:  S/P cataract extraction  S/P colonoscopy  History of umbilical hernia repair  H/O nasal septoplasty      MEDICATIONS:  amoxicillin  875 milliGRAM(s)/clavulanate 1 Tablet(s) Oral two times a day  ibuprofen  Tablet. 800 milliGRAM(s) Oral three times a day  methylPREDNISolone sodium succinate IVPB 250 milliGRAM(s) IV Intermittent every 6 hours  pantoprazole    Tablet 40 milliGRAM(s) Oral two times a day      ALLERGIES:  No Known Allergies      VITALS & I/Os:  Vital Signs Last 24 Hrs  T(C): 36.7 (03 Mar 2020 12:48), Max: 37.5 (02 Mar 2020 17:03)  T(F): 98.1 (03 Mar 2020 12:48), Max: 99.5 (02 Mar 2020 17:03)  HR: 112 (03 Mar 2020 12:48) (85 - 125)  BP: 108/69 (03 Mar 2020 12:48) (108/69 - 144/81)  BP(mean): --  RR: 18 (03 Mar 2020 12:48) (16 - 18)  SpO2: 97% (03 Mar 2020 12:48) (96% - 99%)    I&O's Summary      PHYSICAL EXAM:  General: No acute distress  Respiratory: Nonlabored  Cardiovascular: RRR  Abdominal: Soft, nondistended, nontender  Extremities: Warm  Neuro: vision intact    LABS:                        13.4   7.59  )-----------( 175      ( 03 Mar 2020 07:09 )             41.4     03-03    140  |  104  |  11  ----------------------------<  95  3.9   |  23  |  0.70    Ca    9.6      03 Mar 2020 07:02    TPro  7.2  /  Alb  3.7  /  TBili  0.6  /  DBili  x   /  AST  12  /  ALT  18  /  AlkPhos  76  03-03    Lactate:    PT/INR - ( 02 Mar 2020 15:49 )   PT: 13.1 sec;   INR: 1.13 ratio         PTT - ( 02 Mar 2020 15:49 )  PTT:32.4 sec

## 2020-03-03 NOTE — CONSULT NOTE ADULT - SUBJECTIVE AND OBJECTIVE BOX
CC: sinusitis     HPI:  59y man with a PMH of KARLA, cataracts s/p cataract surgery, nasal septum deviation s/p repair who presented on 03-02-20 with severe HA x 7-10 days. He describes the HA as alternating b/w throbbing and dull bifrontal 4-8/10.  PT admits associated photophobia, tearing, congestion, and left periorbital swelling.  Denies phonophobia, visual acuity changes, loss of vision.  Though he notes no vision changes, he states he has chronic visual issues due to cataracts.  Denies jaw claudication, tinnitus, hearing loss, hearing changes, N/V, confusion, dizziness, light-headedness.  The HA does change with position worsening when lying on either side (L>R) and after prolonged standing.  He does not have a prior HA history. Pt added post nasal drip prior to headache started, reports headache  initially was band like bifrontal, however now localized over left parietal area a/w severe scalp and temporal tenderness. Pt denies dysphagia and hoarseness     PAST MEDICAL & SURGICAL HISTORY:  KARLA (obstructive sleep apnea): per criteria  History of cataract: 6/2019   Right: surgically treated  Deviated nasal septum: &#x27;99   surgically treated  Bilateral inguinal hernia  Fatty liver  Umbilical hernia: &#x27; 2016  surgically treated  S/P cataract extraction: 6/2019   Right  S/P colonoscopy: &#x27; 16  Negative  History of umbilical hernia repair: &#x27; 2016  with Mesh  H/O nasal septoplasty: &#x27;99    Allergies    No Known Allergies    Intolerances      MEDICATIONS  (STANDING):  ampicillin/sulbactam  IVPB      fluticasone propionate 50 MICROgram(s)/spray Nasal Spray 1 Spray(s) Both Nostrils two times a day  ibuprofen  Tablet. 800 milliGRAM(s) Oral three times a day  oxymetazoline 0.05% Nasal Spray 1 Spray(s) Both Nostrils two times a day  pantoprazole    Tablet 40 milliGRAM(s) Oral two times a day  sodium chloride 0.65% Nasal 2 Spray(s) Both Nostrils three times a day    MEDICATIONS  (PRN):      Social History: no tobacco, no etoh     Family history: Pt denies any sign FHx    ROS:   ENT: all negative except as noted in HPI   CV: denies palpitations  Pulm: denies SOB, cough, hemoptysis  GI: denies change in apetite, indigestion, n/v  : denies pertinent urinary symptoms, urgency  Neuro: denies numbness/tingling, loss of sensation  Psych: denies anxiety  MS: denies muscle weakness, instability  Heme: denies easy bruising or bleeding  Endo: denies heat/cold intolerance, excessive sweating  Vascular: denies LE edema    Vital Signs Last 24 Hrs  T(C): 36.7 (03 Mar 2020 12:48), Max: 36.8 (02 Mar 2020 22:51)  T(F): 98.1 (03 Mar 2020 12:48), Max: 98.3 (02 Mar 2020 22:51)  HR: 112 (03 Mar 2020 12:48) (87 - 114)  BP: 108/69 (03 Mar 2020 12:48) (108/69 - 144/81)  BP(mean): --  RR: 18 (03 Mar 2020 12:48) (16 - 18)  SpO2: 97% (03 Mar 2020 12:48) (96% - 98%)                          13.4   7.59  )-----------( 175      ( 03 Mar 2020 07:09 )             41.4    03-03    140  |  104  |  11  ----------------------------<  95  3.9   |  23  |  0.70    Ca    9.6      03 Mar 2020 07:02    TPro  7.2  /  Alb  3.7  /  TBili  0.6  /  DBili  x   /  AST  12  /  ALT  18  /  AlkPhos  76  03-03   PT/INR - ( 02 Mar 2020 15:49 )   PT: 13.1 sec;   INR: 1.13 ratio         PTT - ( 02 Mar 2020 15:49 )  PTT:32.4 sec    PHYSICAL EXAM:  Gen: NAD  Skin: No rashes, bruises, or lesions  Head: Normocephalic, Atraumatic  Face: no edema, erythema, or fluctuance. Parotid glands soft without mass, minimal left temporal tenderness.  Eyes: no scleral injection, right eye veers lateral, EOM normal  Nose: Nares bilaterally patent, no discharge  Mouth: No Stridor / Drooling / Trismus.  Mucosa moist, tongue/uvula midline, oropharynx clear  Neck: Flat, supple, no lymphadenopathy, trachea midline, no masses  Lymphatic: No lymphadenopathy  Resp: breathing easily, no stridor  CV: no peripheral edema/cyanosis  GI: nondistended   Peripheral vascular: no JVD or edema  Neuro: facial nerve intact, no facial droop      Diagnostic Nasal Endoscopy  Indication for procedure: nasal congestion    "Anterior rhinoscopy insufficient to account for symptoms"    Verbal and/or written consent obtained from patient    Scope #3: flexible fiber optic telescope used with surgilube     + right sigmoidal septal deviation noted. Mucosa moist with scant mucus,  left sphenethmoid recess with drainage and left middle meatus polyp. inferior/middle/superior turbinates, normal inferion/ superior meatus, normal fontanelles, maxillary ostia clear.     Fiberoptic Indirect laryngoscopy:  (Scope #2 used) Reason for Laryngoscopy:    Patient was unable to cooperate with mirror.  Nasopharynx, oropharynx, and hypopharynx clear, no bleeding. Tongue base, posterior pharyngeal wall, vallecula, epiglottis, and subglottis appear normal. No erythema, edema, pooling of secretions, masses or lesions. Airway patent, no foreign body visualized. No glottic/supraglottic edema. True vocal cords, arytenoids, vestibular folds, ventricles, pyriform sinuses, and aryepiglottic folds appear normal bilaterally. Vocal cords mobile with good contact b/l.                  IMAGING/ADDITIONAL STUDIES: < from: CT Angio Head w/ IV Cont (03.02.20 @ 18:15) >    CT head:    No acute intracranial hemorrhage, mass effect, vasogenic edema, or evidence of acute territorial infarct.    Air-fluid levels in the bilateral maxillary and ethmoid sinuses. Complete opacificationof the sphenoid sinus. Correlate for the presence of acute sinusitis.    CTV brain:    No evidence for venous sinus or cortical vein thrombosis.    < end of copied text > CC: sinusitis     HPI:  59y man with a PMH of KARLA, cataracts s/p cataract surgery, nasal septum deviation s/p repair who presented on 03-02-20 with severe HA x 7-10 days. He describes the HA as alternating b/w throbbing and dull bifrontal 4-8/10.  PT admits associated photophobia, tearing, congestion, and left periorbital swelling.  Denies phonophobia, visual acuity changes, loss of vision.  Though he notes no vision changes, he states he has chronic visual issues due to cataracts.  Denies jaw claudication, tinnitus, hearing loss, hearing changes, N/V, confusion, dizziness, light-headedness.  The HA does change with position worsening when lying on either side (L>R) and after prolonged standing.  He does not have a prior HA history. Pt added post nasal drip prior to headache started, reports headache  initially was band like bifrontal, however now localized over left parietal area a/w severe scalp and temporal tenderness. Pt denies dysphagia and hoarseness     PAST MEDICAL & SURGICAL HISTORY:  KARLA (obstructive sleep apnea): per criteria  History of cataract: 6/2019   Right: surgically treated  Deviated nasal septum: &#x27;99   surgically treated  Bilateral inguinal hernia  Fatty liver  Umbilical hernia: &#x27; 2016  surgically treated  S/P cataract extraction: 6/2019   Right  S/P colonoscopy: &#x27; 16  Negative  History of umbilical hernia repair: &#x27; 2016  with Mesh  H/O nasal septoplasty: &#x27;99    Allergies    No Known Allergies    Intolerances      MEDICATIONS  (STANDING):  ampicillin/sulbactam  IVPB      fluticasone propionate 50 MICROgram(s)/spray Nasal Spray 1 Spray(s) Both Nostrils two times a day  ibuprofen  Tablet. 800 milliGRAM(s) Oral three times a day  oxymetazoline 0.05% Nasal Spray 1 Spray(s) Both Nostrils two times a day  pantoprazole    Tablet 40 milliGRAM(s) Oral two times a day  sodium chloride 0.65% Nasal 2 Spray(s) Both Nostrils three times a day    MEDICATIONS  (PRN):      Social History: no tobacco, no etoh     Family history: Pt denies any sign FHx    ROS:   ENT: all negative except as noted in HPI   CV: denies palpitations  Pulm: denies SOB, cough, hemoptysis  GI: denies change in apetite, indigestion, n/v  : denies pertinent urinary symptoms, urgency  Neuro: denies numbness/tingling, loss of sensation  Psych: denies anxiety  MS: denies muscle weakness, instability  Heme: denies easy bruising or bleeding  Endo: denies heat/cold intolerance, excessive sweating  Vascular: denies LE edema    Vital Signs Last 24 Hrs  T(C): 36.7 (03 Mar 2020 12:48), Max: 36.8 (02 Mar 2020 22:51)  T(F): 98.1 (03 Mar 2020 12:48), Max: 98.3 (02 Mar 2020 22:51)  HR: 112 (03 Mar 2020 12:48) (87 - 114)  BP: 108/69 (03 Mar 2020 12:48) (108/69 - 144/81)  BP(mean): --  RR: 18 (03 Mar 2020 12:48) (16 - 18)  SpO2: 97% (03 Mar 2020 12:48) (96% - 98%)                          13.4   7.59  )-----------( 175      ( 03 Mar 2020 07:09 )             41.4    03-03    140  |  104  |  11  ----------------------------<  95  3.9   |  23  |  0.70    Ca    9.6      03 Mar 2020 07:02    TPro  7.2  /  Alb  3.7  /  TBili  0.6  /  DBili  x   /  AST  12  /  ALT  18  /  AlkPhos  76  03-03   PT/INR - ( 02 Mar 2020 15:49 )   PT: 13.1 sec;   INR: 1.13 ratio         PTT - ( 02 Mar 2020 15:49 )  PTT:32.4 sec    PHYSICAL EXAM:  Gen: NAD  Skin: No rashes, bruises, or lesions  Head: Normocephalic, Atraumatic  Face: no edema, erythema, or fluctuance. Parotid glands soft without mass, minimal left temporal tenderness.  Eyes: no scleral injection, right eye veers lateral, EOM normal  Nose: Nares bilaterally patent, no discharge  Mouth: No Stridor / Drooling / Trismus.  Mucosa moist, tongue/uvula midline, oropharynx clear  Neck: Flat, supple, no lymphadenopathy, trachea midline, no masses  Lymphatic: No lymphadenopathy  Resp: breathing easily, no stridor  CV: no peripheral edema/cyanosis  GI: nondistended   Peripheral vascular: no JVD or edema  Neuro: facial nerve intact, no facial droop      Diagnostic Nasal Endoscopy  Indication for procedure: nasal congestion    "Anterior rhinoscopy insufficient to account for symptoms"    Verbal and/or written consent obtained from patient    Scope #3: flexible fiber optic telescope used with surgilube     + right sigmoidal septal deviation noted. Mucosa moist with scant mucus,  left sphenethmoid recess with drainage and left middle meatus polyp. inferior/middle/superior turbinates, normal inferion/ superior meatus, normal fontanelles    Fiberoptic Indirect laryngoscopy:  (Scope #2 used) Reason for Laryngoscopy:    Patient was unable to cooperate with mirror.  ropharynx, and hypopharynx clear, no bleeding. Tongue base, posterior pharyngeal wall, vallecula, epiglottis, and subglottis appear normal. No erythema, edema, pooling of secretions, masses or lesions. Airway patent, no foreign body visualized. No glottic/supraglottic edema. True vocal cords, arytenoids, vestibular folds, ventricles, pyriform sinuses, and aryepiglottic folds appear normal bilaterally. Vocal cords mobile with good contact b/l.                  IMAGING/ADDITIONAL STUDIES: < from: CT Angio Head w/ IV Cont (03.02.20 @ 18:15) >    CT head:    No acute intracranial hemorrhage, mass effect, vasogenic edema, or evidence of acute territorial infarct.    Air-fluid levels in the bilateral maxillary and ethmoid sinuses. Complete opacificationof the sphenoid sinus. Correlate for the presence of acute sinusitis.    CTV brain:    No evidence for venous sinus or cortical vein thrombosis.    < end of copied text >

## 2020-03-03 NOTE — CONSULT NOTE ADULT - ATTENDING COMMENTS
plan TA biopsy thurs
59y R-handed man with a PMH of KARLA, bilateral cataracts s/p right cataract surgery, nasal septum deviation s/p repair who presented on 03-02-20 with severe HA. HA first started 7 days ago and was gradual in onset.  He describes the HA as alternating b/w throbbing and dull bifrontal 4-8/10, with sensation of a band around the head,  "like wearing a hat 5 sizes too small".  PT admits associated photophobia, tearing, congestion, and left periorbital swelling. He has hx of right chronic strabismus for the past 6 yrs, unknown etiology but he says started after he had eye surgery. The headaches are worse in the morning and when he stands up, with sensation of pain right at the crown of his head, and then also on the left side with pain in the eye and also on eye movements, worse looking up. Pt was previously a  but denies any head trauma. He has one headache several years ago that lasted a few hours. ESR elevated on admission to 45, CRP to 5.6.     On exam he has temporal tenderness on the left side, as well as the top of the head. His eyes are proptotic. EOMI in the left eye which elicits pain especially with right and upward gaze. The right eye is exotropic, he can focus midline but cannot  maintain and the eye drift back out, medial rectus palsy, superior rectus palsy, superior oblique palsy, and inferior rectus palsy. PERRL, no other cranial nerve abnormalities. No dysmetria or ataxia. VFF.      CTH reviewed. There appears to be no optic nerve mass, intracranial mass, or stroke. Both eyes appear proptotic, increased infiltration behind the left eye.    Clinical suspicion for temporal arteritis, however will also rule out periorbital causes such as myositis, leptomeningeal disease, sinusitis.   MRI brain w/wo, MRI orbits w/wo  Possible temporal artery biopsy with vascular sx. Rheumatology  and ophthalmology following.   pt given prednisone 60mg , Started on solu-medrol 250mg q6
as above
I have interviewed and examined the patient and reviewed the residents note including the history, exam, assessment, and plan.  I agree with the residents assessment and plan.  See changes below.    Concern for GCA due to HA + age + elevated ESR/CRP  Pansinusitis- should be evaluated by ENT  IV steroids per rheum with GI prophylaxis  Rheum consult  Will likely need TA biopsy  Findings and plan discussed with patient and primary team  Advised pt to let team know if he notices any changes in his vision  Case discussed with Dr. Victor, plan per Dr. Victor, rheum, and primary team    Keisha Tracey MD

## 2020-03-03 NOTE — PROGRESS NOTE ADULT - SUBJECTIVE AND OBJECTIVE BOX
Patient is a 59y old  Male who presents with a chief complaint of HA etc. (03 Mar 2020 09:09)                                                               INTERVAL HPI/OVERNIGHT EVENTS:  feels less photophobia   still tenderness over scalp   HA with some improvement       REVIEW OF SYSTEMS:     CONSTITUTIONAL: No weakness, fevers or chills  EYES/ENT: No visual changes , no ear ache   RESPIRATORY: No cough, wheezing,  No shortness of breath  CARDIOVASCULAR: No chest pain or palpitations  GASTROINTESTINAL: No abdominal pain  . No nausea, vomiting, or hematemesis; No diarrhea or constipation. No melena or hematochezia.  GENITOURINARY: No dysuria, frequency or hematuria  NEUROLOGICAL: No numbness or weakness                                                                                                                                                                                                                                                                                   Medications:  MEDICATIONS  (STANDING):  ampicillin/sulbactam  IVPB      ibuprofen  Tablet. 800 milliGRAM(s) Oral three times a day  pantoprazole    Tablet 40 milliGRAM(s) Oral two times a day    MEDICATIONS  (PRN):       Allergies    No Known Allergies    Intolerances      Vital Signs Last 24 Hrs  T(C): 36.7 (03 Mar 2020 12:48), Max: 37 (02 Mar 2020 19:20)  T(F): 98.1 (03 Mar 2020 12:48), Max: 98.6 (02 Mar 2020 19:20)  HR: 112 (03 Mar 2020 12:48) (85 - 114)  BP: 108/69 (03 Mar 2020 12:48) (108/69 - 144/81)  BP(mean): --  RR: 18 (03 Mar 2020 12:48) (16 - 18)  SpO2: 97% (03 Mar 2020 12:48) (96% - 99%)  CAPILLARY BLOOD GLUCOSE          Physical Exam:    Daily Height in cm: 198.12 (02 Mar 2020 22:51)    Daily Weight in k.4 (02 Mar 2020 22:51)  General:  Well appearing, NAD, not cachetic    HEENT:  Nonicteric, PERRLA tenderness over temproal area  L > R   CV:  RRR, S1S2   Lungs:  CTA B/L, no wheezes, rales, rhonchi  Abdomen:  Soft, non-tender, no distended, positive BS  Extremities:  2+ pulses, no c/c, no edema  Skin:  Warm and dry, no rashes  :  No daniels  Neuro:  AAOx3, non-focal, grossly intact                                                                                                                                                                                                                                                                                                LABS:                               13.4   7.59  )-----------( 175      ( 03 Mar 2020 07:09 )             41.4                      03-03    140  |  104  |  11  ----------------------------<  95  3.9   |  23  |  0.70    Ca    9.6      03 Mar 2020 07:02    TPro  7.2  /  Alb  3.7  /  TBili  0.6  /  DBili  x   /  AST  12  /  ALT  18  /  AlkPhos  76  -03

## 2020-03-03 NOTE — PROGRESS NOTE ADULT - ASSESSMENT
60 y/o male with no significant pmh , one episode of migraine HA years ago ( not similar to current presnetation) sent for evaluation for persistent LAMBERT from 's office.  Reports frontal HA, constant with no N/V/Fever or chills / no rashes.. HA is at times throbbing but overall dull and constant.. worse upon changing position as he lays on R but worse as he lays on L and much worse the first few min when he stands.. no nuchal rigidindity or neck pain   no ear pain .. associated photophobia hwoever seems to have that at baseline though seems to be worse now.   motrin helped somewhat yesterday with pain...   in addition to his HA he noted L eye pain ( though mild )  discharge from eye ( thicker than tears ) noted today.  in ED vitally stable afeb   CT : neg for vein thrombosis .   moderate elevation of eSR   mild hyponatremia 133     - HA : with photophobia but no fever   discused w/ optho : scalp tenderness is not explained by sinusitis .. pt with temptoral tenderness and ? decreased pulses   vascular input noted : will plan bx ...  cont empiric steroids for now   appreciate rheum input   neuro input appreciated : check MR for HA   NSAIDS for pain     - hyponatremia : mild  improved     - sinusitis : cont abx   disucussed with ENT

## 2020-03-03 NOTE — CONSULT NOTE ADULT - SUBJECTIVE AND OBJECTIVE BOX
NEUROLOGY CONSULT   HPI: 59y R-handed man with a PMH of KARLA, cataracts s/p cataract surgery, nasal septum deviation s/p repair who presented on 03-02-20 with severe HA. HA first started 7 days ago and was gradual in onset.  He describes the HA as alternating b/w throbbing and dull bifrontal 4-8/10.  PT admits associated photophobia, tearing, congestion, and left periorbital swelling.  Denies phonophobia, visual acuity changes, loss of vision.  Though he notes no vision changes, he states he has chronic visual issues due to cataracts.  Denies jaw claudication, tinnitus, hearing loss, hearing changes, N/V, confusion, dizziness, light-headedness.  The HA does change with position worsening when lying on either side (L>R) and after prolonged standing.  He does not have a prior HA history.      Upon exam, PT notes he no longer has a HA and that it has improved with Tylenol.  Reports his right eye upward deviation is chronic for past one year.  Was evaluated for cataracts and told this was possibly due to cataracts? (Outside verification still needed).      ROS: A 10-system ROS was performed and is negative except for those items noted above.     PMH:  KARLA (obstructive sleep apnea)  History of cataract  Deviated nasal septum  Bilateral inguinal hernia  Fatty liver  Umbilical hernia    Home Medications:  biotin: 1 tab(s) orally once a day (09 Jul 2019 12:08)  Multiple Vitamins oral capsule: 1 cap(s) orally once a day (09 Jul 2019 12:08)    MEDICATIONS  (STANDING):  ibuprofen  Tablet. 800 milliGRAM(s) Oral three times a day  pantoprazole    Tablet 40 milliGRAM(s) Oral two times a day    ALLERGIES: No Known Allergies    PSH:   S/P cataract extraction  S/P colonoscopy  History of umbilical hernia repair  H/O nasal septoplasty    FH:  Family history of metastatic neoplastic disease    OBJECTIVE  Vital Signs Last 24 Hrs  T(C): 36.8 (02 Mar 2020 22:51), Max: 37.5 (02 Mar 2020 17:03)  T(F): 98.3 (02 Mar 2020 22:51), Max: 99.5 (02 Mar 2020 17:03)  HR: 87 (02 Mar 2020 22:51) (85 - 125)  BP: 143/81 (02 Mar 2020 22:51) (128/79 - 143/81)  BP(mean): --  RR: 16 (02 Mar 2020 22:51) (16 - 18)  SpO2: 98% (02 Mar 2020 22:51) (98% - 99%)  I&O's Summary    PHYSICAL EXAM:  General: In bed sleeping, appears stated age, in NAD  Cardiac: S1, S2 normal. RRR with regular pulse.  No murmurs, rubs or gallops.  Respiratory: CTA throughout with good air entry.  HEENT: No tenderness to palpation of either temporal arteries.  Mild tenderness to palpation Bifrontal sinuses.  No neck stiffness/pain.  Left eye periorbital swelling.   Skin: No rashes, lesions or color changes.  Neurologic:  Mental Status: Asleep but immediately arouses to his name.  Alert, oriented to person, place, situation, time. Normal affect. Follows all commands.  Language: Speech is clear, fluent with preserved naming, repetition and comprehension.    Cranial Nerves: PERRL (R = 3mm, L = 3mm). OD exotropia on primary gaze intermittently (corrects to midline).  OD vertical and lateral skew deviation on upward gaze.  Visual fields grossly intact. V1-3 intact to light touch.  No facial asymmetry.  Hearing grossly normal to conversation.  Symmetric palate elevation in midline.  Head turning & shoulder shrug intact b/l. Tongue midline, normal movements, no atrophy.  Motor: Normal muscle bulk & tone. No noticeable tremor, myoclonus or pronator drift. 5/5 strength throughout.	  Sensation: Symmetric B/L preserved sensation to light touch, pin prick, position.    Cortical: No extinction on double simultaneous touch and no signs of neglect.   Reflexes: 2/4.  Plantar Responses are downgoing B/L.   Coordination: Intact rapid-alternating movements. No dysmetria on finger-to-nose or heel-to-shin.   Psychiatric: Annoyed mood and congruent affect.     CBC:                        13.9   10.22 )-----------( 199      ( 02 Mar 2020 15:49 )             43.3   CMP:  03-02    133<L>  |  99  |  8   ----------------------------<  103<H>  4.2   |  18<L>  |  0.63    Ca    9.5      02 Mar 2020 15:49    TPro  7.8  /  Alb  3.9  /  TBili  0.7  /  DBili  x   /  AST  15  /  ALT  19  /  AlkPhos  88  03-02  PT/INR - ( 02 Mar 2020 15:49 )   PT: 13.1 sec;   INR: 1.13 ratio      03/02/20 CT Head and CTV Brain  CT head: No acute intracranial hemorrhage, mass effect, vasogenic edema, or evidence of acute territorial infarct. Air-fluid levels in the bilateral maxillary and ethmoid sinuses. Complete opacification of the sphenoid sinus. Correlate for the presence of acute sinusitis.    CTV brain: No evidence for venous sinus or cortical vein thrombosis.

## 2020-03-03 NOTE — CONSULT NOTE ADULT - ASSESSMENT
Assessment and Recommendations:  59y male w/ pmhx/ochx of cataract extraction in right eye consulted for 1 wk of headaches, found to have ***      - with exquisite scalp tenderness, HA, and elevated ESR/CRP, Please give IV solumedrol 250mg IV q6 for 12 doses, then switch to oral pred.   - Although headache can be caused by the sinus, scalp tenderness on left side doesn't really correlate with sinusitis    - full note to follow. Please give steroid ASAP while we work up.       d/w  (Neuroophthalmology)  Outpatient follow-up: Patient should follow-up with his/her ophthalmologist or with [facility] Department of Ophthalmology within 1 week of after discharge at:    [address]  [place] [address]  [phone] Assessment and Recommendations:  59y male w/ pmhx/ochx of cataract extraction in right eye consulted for 1 wk of headaches, found to have normal eye exam    - with exquisite scalp tenderness, HA, and elevated ESR/CRP, Please give IV solumedrol 250mg IV q6 for 12 doses, then switch to oral pred. Steroid dosing per rheumatology.  - Although headache can be caused by the sinus, scalp tenderness on left side with temporal tenderness doesn't really correlate with sinusitis. Patient jumping up when left scalp is palpated.   - Vascular surgery for Temporal artery biopsy, within 1 week of starting steroids   - GI ppx when on steroids.   - Rheum consult for possible temporal arteritis  - d/w patient and family at bedside     d/w  (Neuroophthalmology)  s/d/w Dr Tracey   Outpatient follow-up: Patient should follow-up with his/her ophthalmologist or with [facility] Department of Ophthalmology within 1 week of after discharge at:    [address]  [place] [address]  [phone] Assessment and Recommendations:  59y male w/ pmhx/ochx of cataract extraction in right eye consulted for 1 wk of headaches, found to have normal eye exam    - with exquisite scalp tenderness, HA, and elevated ESR/CRP, Please give IV solumedrol 250mg IV q6 for 12 doses, then switch to oral pred. Steroid dosing per rheumatology.  - Although headache can be caused by the sinus, scalp tenderness on left side with temporal tenderness doesn't really correlate with sinusitis. Patient jumping up when left scalp is palpated.   - Vascular surgery for Temporal artery biopsy, within 1 week of starting steroids   - GI ppx when on steroids.   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   - no double vision, full extraocular movements, and VA excellent in both eyes  - monitoring as outpatient.     d/w  (Neuroophthalmology)  s/d/w Dr Tracey   Outpatient follow-up: Patient should follow-up with his/her ophthalmologist or with [facility] Department of Ophthalmology within 1 week of after discharge at:    [address]  [place], [place] [address] [phone] Assessment and Recommendations:  59y male w/ pmhx/ochx of cataract extraction in right eye consulted for 1 wk of headaches, found to have normal eye exam    - with exquisite scalp tenderness, HA, and elevated ESR/CRP, Please give IV solumedrol 250mg IV q6 for 12 doses, then switch to oral pred. Steroid dosing per rheumatology.  - Although headache can be caused by the sinus, scalp tenderness on left side with temporal tenderness doesn't really correlate with sinusitis. Patient jumping up when left scalp is palpated.   - Vascular surgery for Temporal artery biopsy, within 1 week of starting steroids   - GI ppx when on steroids.   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   - no double vision, full extraocular movements, and VA excellent in both eyes, likely not anything acute.   - no objection to MRI head as per neuro  - monitoring as outpatient.     d/w  (Neuroophthalmology)  s/d/w Dr Tracey   Outpatient follow-up: Patient should follow-up with his/her ophthalmologist or with [facility] Department of Ophthalmology within 1 week of after discharge at:    [address]  [place] [address]  [phone]

## 2020-03-03 NOTE — CONSULT NOTE ADULT - ASSESSMENT
58 yo male with headache x 7-10 days , found to have acute sinusitis on exam and imaging, which explains his symptoms. Discussed with ENT.   Rheumatology was called concerned for GCA, which seems highly unlikely given the existing diagnosis of acute sinusitis which can explain his symptoms. In addition would expect higher ESR in GCA. Eye exam also reported normal by ophthalmology team.  However in view of decreased left TA pulse and scalp/ left temporal tenderness on exam and since pt wants to pursue the gold standard diagnostic test for GCA which is biopsy, will obtain TA biopsy.    - C/w prednisone 60 mg po daily for now  - GI PPX   - Follow with surgery team for TA biopsy   - Antibiotics as per ID and ENT    Rheum will follow  Case was seen and discussed with Dr. Peng Sloan MD  Rheum fellow PGY 4   Pager [phone] 58 yo male with headache x 7-10 days , found to have acute sinusitis on exam and imaging, which explains his symptoms. Discussed with ENT.   Rheumatology was called for concern for GCA, which seems less likely given the existing diagnosis of acute sinusitis which can explain his symptoms. In addition would expect higher ESR in GCA. Eye exam also reported normal by ophthalmology team.  However in view of decreased left TA pulse and scalp/ left temporal tenderness on exam, GCA remains in the differential despite low likelihood (in the absence of constitutional sx, borderline ESR...)  Reviewed above with patient and wife at bedside and that the gold standard diagnostic test for GCA is TA , patient would like to pursue the biopsy.    - C/w prednisone 60 mg po daily for now  - GI PPX   - Follow with  vascular surgery team for TA biopsy scheduling   - Antibiotics as per ID and ENT    Rheum will follow  Case was seen and discussed with Dr. Peng Sloan MD  Rheum fellow PGY 4   Pager [phone]

## 2020-03-03 NOTE — CONSULT NOTE ADULT - PROBLEM SELECTOR RECOMMENDATION 9
- unasyn   - nasal saline 2 sprays to b/l nares tid   - afrin 2 sprays/nostril tid x 4 days only  - HOB elevation   - will continue to follow.

## 2020-03-03 NOTE — CONSULT NOTE ADULT - SUBJECTIVE AND OBJECTIVE BOX
[facility] DEPARTMENT OF OPHTHALMOLOGY - INITIAL ADULT CONSULT  -----------------------------------------------------------------------------------------------------------------  Ella Suyeon Yu, MD PGY 2  Pager: [phone]  -----------------------------------------------------------------------------------------------------------------    HPI:  58 y/o male with no significant pmh , one episode of migraine HA years ago ( not similar to current presnetation) sent for evaluation for persistent LAMBERT from 's office.  Reports frontal HA, constant with no N/V/Fever or chills / no rashes.. HA is at times throbbing but overall dull and constant.. worse upon changing position as he lays on R but worse as he lays on L and much worse the first few min when he stands.. no nuchal rigidindity or neck pain   no ear pain .. associated photophobia hwoever seems to have that at baseline though seems to be worse now.   motrin helped somewhat yesterday with pain...   in addition to his HA he noted L eye pain ( though mild )  discharge from eye ( thicker than tears ) noted today.  he had recieved mucinex for possible sinusitis hwoever with no help over the past week   in ED vitally stable afeb   CT : neg for vein thrombosis .   moderate elevation of eSR   mild hyponatremia 133 (02 Mar 2020 21:04)    Interval History: 60 YO M with no PMH coming in for HA ~1 week. Reports HA is frontal, but wraps around the head like a band, sometimes left side is worse. No N/V. States HA is dull and constant and improves somewhat with advil but is constant all day. Associated with photophobia, but states that's his baseline, thinks photophobia maybe somewhat worse since 1 week ago. Tried mucinex, tylenol, and advil, with minimal improvement. No flashes/floaters. No vision changes. Exquisite scalp tenderness left scalp, slight tenderness left forehead/temple area. No tenderness Right scalp or forehead. No jaw claudication, no joint pains, weight loss recently but states that was intentional.     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 CEIOL 1yr ago OD.     FAMILY HISTORY:  Family history of metastatic neoplastic disease: Mother    Social History: None    Ophthalmic Medications: None    MEDICATIONS  (STANDING):  amoxicillin  875 milliGRAM(s)/clavulanate 1 Tablet(s) Oral two times a day  ibuprofen  Tablet. 800 milliGRAM(s) Oral three times a day  pantoprazole    Tablet 40 milliGRAM(s) Oral two times a day  predniSONE   Tablet 60 milliGRAM(s) Oral once    MEDICATIONS  (PRN):    Allergies & Intolerances: None    Review of Systems:  Constitutional: No fever, chills  Eyes: No blurry vision, flashes, floaters, FBS, erythema, discharge, double vision, OU  Neuro: No tremors  Cardiovascular: No chest pain, palpitations  Respiratory: No SOB, no cough  GI: No nausea, vomiting, abdominal pain  : No dysuria  Skin: no rash  Psych: no depression  Endocrine: no polyuria, polydipsia  Heme/lymph: no swelling    VITALS: T(C): 36.7 (03-03-20 @ 04:41)  T(F): 98 (03-03-20 @ 04:41), Max: 99.5 (03-02-20 @ 17:03)  HR: 114 (03-03-20 @ 04:41) (85 - 125)  BP: 144/81 (03-03-20 @ 04:41) (128/79 - 144/81)  RR:  (16 - 18)  SpO2:  (96% - 99%)  Wt(kg): --  General: AAO x 3, appropriate mood and affect    Ophthalmology Exam:  Visual acuity (sc): 20/20, 20/20-2 OU  Pupils: PERRL OU, no APD  Ttono: 10, 15 OU  Extraocular movements (EOMs): Full OU, no pain, no diplopia  Confrontational Visual Field (CVF): Full OU  Color Plates: 12/12 OU    Slit Lamp Exam   External: Flat OU  Lids/Lashes/Lacrimal Ducts: Flat OU    Sclera/Conjunctiva: W+Q OU  Cornea: Cl OU  Anterior Chamber: D+Q OU    Iris: Flat OU  Lens: PCIOL OD, NS OS    Fundus Exam: dilated with 1% tropicamide and 2.5% phenylephrine  Approval obtained from primary team for dilation  Patient aware that pupils can remained dilated for at least 4-6 hours  Exam performed with 20D lens    Vitreous: wnl OU  Disc, cup/disc: sharp and pink, 0.4 OU  Macula: wnl OU  Vessels: wnl OU  Periphery: wnl OU    Labs/Imaging:      < from: CT Angio Head w/ IV Cont (03.02.20 @ 18:15) >    IMPRESSION:     CT head:    No acute intracranial hemorrhage, mass effect, vasogenic edema, or evidence of acute territorial infarct.    Air-fluid levels in the bilateral maxillary and ethmoid sinuses. Complete opacificationof the sphenoid sinus. Correlate for the presence of acute sinusitis.    CTV brain:    No evidence for venous sinus or cortical vein thrombosis.          < end of copied text > [facility] DEPARTMENT OF OPHTHALMOLOGY - INITIAL ADULT CONSULT  -----------------------------------------------------------------------------------------------------------------  Ella Suyeon Yu, MD PGY 2  Pager: [phone]  -----------------------------------------------------------------------------------------------------------------    HPI:  58 y/o male with no significant pmh , one episode of migraine HA years ago ( not similar to current presnetation) sent for evaluation for persistent LAMBERT from 's office.  Reports frontal HA, constant with no N/V/Fever or chills / no rashes.. HA is at times throbbing but overall dull and constant.. worse upon changing position as he lays on R but worse as he lays on L and much worse the first few min when he stands.. no nuchal rigidindity or neck pain   no ear pain .. associated photophobia hwoever seems to have that at baseline though seems to be worse now.   motrin helped somewhat yesterday with pain...   in addition to his HA he noted L eye pain ( though mild )  discharge from eye ( thicker than tears ) noted today.  he had recieved mucinex for possible sinusitis hwoever with no help over the past week   in ED vitally stable afeb     CT : neg for vein thrombosis .   moderate elevation of eSR   mild hyponatremia 133 (02 Mar 2020 21:04)    Interval History: 58 YO M with no PMH coming in for HA ~1 week. Reports HA is frontal, but wraps around the head like a band, sometimes left side is worse. No N/V. States HA is dull and constant and improves somewhat with advil but is constant all day. Associated with photophobia, but states that's his baseline, thinks photophobia maybe somewhat worse since 1 week ago. Tried mucinex, tylenol, and advil, with minimal improvement. No flashes/floaters. No vision changes. Exquisite scalp tenderness left scalp, slight tenderness left forehead/temple area. No tenderness Right scalp or forehead. No jaw claudication, no joint pains, weight loss recently but states that was intentional.     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 CEIOL 1yr ago OD. 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 800 milliGRAM(s) Oral three times a day  pantoprazole    Tablet 40 milliGRAM(s) Oral two times a day  predniSONE   Tablet 60 milliGRAM(s) Oral once    MEDICATIONS  (PRN):    Allergies & Intolerances: None    Review of Systems:  Constitutional: No fever, chills  Eyes: No blurry vision, flashes, floaters, FBS, erythema, discharge, double vision, OU  Neuro: No tremors  Cardiovascular: No chest pain, palpitations  Respiratory: No SOB, no cough  GI: No nausea, vomiting, abdominal pain  : No dysuria  Skin: no rash  Psych: no depression  Endocrine: no polyuria, polydipsia  Heme/lymph: no swelling    VITALS: T(C): 36.7 (03-03-20 @ 04:41)  T(F): 98 (03-03-20 @ 04:41), Max: 99.5 (03-02-20 @ 17:03)  HR: 114 (03-03-20 @ 04:41) (85 - 125)  BP: 144/81 (03-03-20 @ 04:41) (128/79 - 144/81)  RR:  (16 - 18)  SpO2:  (96% - 99%)  Wt(kg): --  General: AAO x 3, appropriate mood and affect    Ophthalmology Exam:  Visual acuity (sc): 20/20, 20/20-2 OU  Pupils: PERRL OU, no APD  Ttono: 10, 15 OU  Extraocular movements (EOMs): Full OU, no pain, no diplopia  Confrontational Visual Field (CVF): Full OU  Color Plates: 12/12 OU    Slit Lamp Exam   External: Flat OU  Lids/Lashes/Lacrimal Ducts: Flat OU    Sclera/Conjunctiva: W+Q OU  Cornea: Cl OU  Anterior Chamber: D+Q OU    Iris: Flat OU  Lens: PCIOL OD, NS OS    Fundus Exam: dilated with 1% tropicamide and 2.5% phenylephrine  Approval obtained from primary team for dilation  Patient aware that pupils can remained dilated for at least 4-6 hours  Exam performed with 20D lens    Vitreous: wnl OU  Disc, cup/disc: sharp and pink, 0.2 OU, no hyperemia, no swelling  Macula: wnl OU  Vessels: wnl OU  Periphery: wnl OU    Labs/Imaging:      < from: CT Angio Head w/ IV Cont (03.02.20 @ 18:15) >    IMPRESSION:     CT head:    No acute intracranial hemorrhage, mass effect, vasogenic edema, or evidence of acute territorial infarct.    Air-fluid levels in the bilateral maxillary and ethmoid sinuses. Complete opacificationof the sphenoid sinus. Correlate for the presence of acute sinusitis.    CTV brain:    No evidence for venous sinus or cortical vein thrombosis.          < end of copied text > [facility] DEPARTMENT OF OPHTHALMOLOGY - INITIAL ADULT CONSULT  -----------------------------------------------------------------------------------------------------------------  Ella Suyeon Yu, MD PGY 2  Pager: [phone]  -----------------------------------------------------------------------------------------------------------------    HPI:  58 y/o male with no significant pmh , one episode of migraine HA years ago ( not similar to current presnetation) sent for evaluation for persistent LAMBERT from 's office.  Reports frontal HA, constant with no N/V/Fever or chills / no rashes.. HA is at times throbbing but overall dull and constant.. worse upon changing position as he lays on R but worse as he lays on L and much worse the first few min when he stands.. no nuchal rigidindity or neck pain   no ear pain .. associated photophobia hwoever seems to have that at baseline though seems to be worse now.   motrin helped somewhat yesterday with pain...   in addition to his HA he noted L eye pain ( though mild )  discharge from eye ( thicker than tears ) noted today.  he had recieved mucinex for possible sinusitis hwoever with no help over the past week   in ED vitally stable afeb     CT : neg for vein thrombosis .   moderate elevation of eSR   mild hyponatremia 133 (02 Mar 2020 21:04)    Interval History: 60 YO M with no PMH coming in for HA ~1 week. Reports HA is frontal, but wraps around the head like a band, sometimes left side is worse. No N/V. States HA is dull and constant and improves somewhat with advil but is constant all day. Associated with photophobia, but states that's his baseline, thinks photophobia maybe somewhat worse since 1 week ago. Tried mucinex, tylenol, and advil, with minimal improvement. No flashes/floaters. No vision changes. Exquisite scalp tenderness left scalp, slight tenderness left forehead/temple area. No tenderness Right scalp or forehead. No jaw claudication, no joint pains, weight loss recently but states that was intentional.     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 CEIOL 1yr ago OD. 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 800 milliGRAM(s) Oral three times a day  pantoprazole    Tablet 40 milliGRAM(s) Oral two times a day  predniSONE   Tablet 60 milliGRAM(s) Oral once    MEDICATIONS  (PRN):    Allergies & Intolerances: None    Review of Systems:  Constitutional: No fever, chills  Eyes: No blurry vision, flashes, floaters, FBS, erythema, discharge, double vision, OU  Neuro: No tremors  Cardiovascular: No chest pain, palpitations  Respiratory: No SOB, no cough  GI: No nausea, vomiting, abdominal pain  : No dysuria  Skin: no rash  Psych: no depression  Endocrine: no polyuria, polydipsia  Heme/lymph: no swelling    VITALS: T(C): 36.7 (03-03-20 @ 04:41)  T(F): 98 (03-03-20 @ 04:41), Max: 99.5 (03-02-20 @ 17:03)  HR: 114 (03-03-20 @ 04:41) (85 - 125)  BP: 144/81 (03-03-20 @ 04:41) (128/79 - 144/81)  RR:  (16 - 18)  SpO2:  (96% - 99%)  Wt(kg): --  General: AAO x 3, appropriate mood and affect    Ophthalmology Exam:  Visual acuity (sc): 20/20, 20/20-2 OU  Pupils: PERRL OU, no APD  Ttono: 10, 15 OU  Extraocular movements (EOMs): Full OU, no pain, no diplopia  Confrontational Visual Field (CVF): Full OU  Color Plates: 12/12 OU    Slit Lamp Exam   External: Flat OU, + TA pulses OU, + tenderness OS  Lids/Lashes/Lacrimal Ducts: Flat OU    Sclera/Conjunctiva: W+Q OU  Cornea: Cl OU  Anterior Chamber: D+Q OU    Iris: Flat OU  Lens: PCIOL OD, NS OS    Fundus Exam: dilated with 1% tropicamide and 2.5% phenylephrine  Approval obtained from primary team for dilation  Patient aware that pupils can remained dilated for at least 4-6 hours  Exam performed with 20D lens    Vitreous: wnl OU  Disc, cup/disc: sharp and pink, 0.2 OU, no hyperemia, no swelling  Macula: wnl OU  Vessels: wnl OU  Periphery: wnl OU    Labs/Imaging:      < from: CT Angio Head w/ IV Cont (03.02.20 @ 18:15) >    IMPRESSION:     CT head:    No acute intracranial hemorrhage, mass effect, vasogenic edema, or evidence of acute territorial infarct.    Air-fluid levels in the bilateral maxillary and ethmoid sinuses. Complete opacificationof the sphenoid sinus. Correlate for the presence of acute sinusitis.    CTV brain:    No evidence for venous sinus or cortical vein thrombosis.          < end of copied text >

## 2020-03-03 NOTE — CONSULT NOTE ADULT - SUBJECTIVE AND OBJECTIVE BOX
PULMONARY/OUTPATIENT MEDICINE CONSULTATION    HPI: LUIS ALBERTO CELAYA is a 59y Male well known to me, presented to my office last week with vague symptoms of HA/?SInus. Returned yesterday to my office very uncomfortable with bandlike HA, scalp tenderness, swollen/draining left eye, etc. No fever, rash, etc. No nasal discharge. Sent to ER. Workup as below. Slightly better this AM. Seen by neuro.     PAST MEDICAL & SURGICAL HISTORY:  KARLA (obstructive sleep apnea): per criteria  History of cataract: 6/2019   Right: surgically treated  Deviated nasal septum: &#x27;99   surgically treated  Bilateral inguinal hernia  Fatty liver  Umbilical hernia: &#x27; 2016  surgically treated  S/P cataract extraction: 6/2019   Right  S/P colonoscopy: &#x27; 16  Negative  History of umbilical hernia repair: &#x27; 2016  with Mesh  H/O nasal septoplasty: &#x27;99    MEDICATIONS  (STANDING):  ibuprofen  Tablet. 800 milliGRAM(s) Oral three times a day  pantoprazole    Tablet 40 milliGRAM(s) Oral two times a day    MEDICATIONS  (PRN):    ALLERGIES:  No Known Allergies    FAMILY HISTORY:  Family history of metastatic neoplastic disease: Mother    SOCIAL HISTORY:  Smoking History: No  Alcohol: Social  Travel/Pets/Exposures: No    REVIEW OF SYSTEMS:  CONSTITUTIONAL:  Negative for fever, chills, or fatigue  EYES:  Negative for blurred vision, diplopia or eye pain.    E/N/T:  Negative for diminished hearing, nasal congestion or sore throat.    CARDIOVASCULAR:  Negative for chest pain, dizziness, palpitations or pedal edema.    RESPIRATORY:  Negative for cough, dyspnea or wheezing.    GASTROINTESTINAL:  Negative for nausea, vomiting, diarrhea, or abdominal pain    GENITOURINARY:  Negative for dysuria or hematuria.    MUSCULOSKELETAL:  Negative for arthralgias, myalgias or back pain.    INTEGUMENTARY:  Negative for rash.    NEUROLOGICAL:  Negative for dizziness.          PHYSICAL EXAM:  Vital Signs Last 24 Hrs  T(C): 36.7 (03 Mar 2020 04:41), Max: 37.5 (02 Mar 2020 17:03)  T(F): 98 (03 Mar 2020 04:41), Max: 99.5 (02 Mar 2020 17:03)  HR: 114 (03 Mar 2020 04:41) (85 - 125)  BP: 144/81 (03 Mar 2020 04:41) (128/79 - 144/81)  BP(mean): --  RR: 16 (03 Mar 2020 04:41) (16 - 18)  SpO2: 96% (03 Mar 2020 04:41) (96% - 99%)  I&O's Summary    GENERAL: Well developed, well nourished, in no apparent distress   EYES: Lids and conjunctiva are normal; Left eye slightly swollen, mild clear drainage.  E/N/T: Normal external ears and nose, pharynx benign  NECK: Supple,  without JVD, bruit or thyromegaly  CARDIOVASCULAR: Normal rate/rhythm. No murmurs  RESPIRATORY: Normal breath sounds without rales, rhonchi, or wheezes.    GASTROINTESTINAL: Bowel sounds present.  No masses or tenderness   LYMPHATIC: No enlargement of cervical nodes    EXTREMITIES: No clubbing, cyanosis, or edema  SKIN: No ulcerations, lesions or rashes   NEUROLOGIC: Grossly intact    PSYCHIATRIC: Alert and oriented x 3. appropriate affect and demeanor     LABS:                        13.4   7.59  )-----------( 175      ( 03 Mar 2020 07:09 )             41.4     03-03    140  |  104  |  11  ----------------------------<  95  3.9   |  23  |  0.70    Ca    9.6      03 Mar 2020 07:02    TPro  7.2  /  Alb  3.7  /  TBili  0.6  /  DBili  x   /  AST  12  /  ALT  18  /  AlkPhos  76  03-03    PT/INR - ( 02 Mar 2020 15:49 )   PT: 13.1 sec;   INR: 1.13 ratio         PTT - ( 02 Mar 2020 15:49 )  PTT:32.4 sec    RADIOLOGY & ADDITIONAL STUDIES: Reviewed.     Assessment:  Vague constellation of symptoms. Not clearly clinically sinusitis but he certainly has CT evidence of disease. ?GCA or occult zoster less likely.     Plan:  Will begin Augmentin  Will give one time dose of 60 mg Prednisone per neuro. This should also help his sinus disease.  Rheum evaluation  ?MRI per neuro  Analgesics as needed  All reviewed at length with patient.     Adelso Cuenca MD, FACP, [facility], [place] [address]  [phone]

## 2020-03-04 ENCOUNTER — TRANSCRIPTION ENCOUNTER (OUTPATIENT)
Age: 60
End: 2020-03-04

## 2020-03-04 LAB
HAV IGM SER-ACNC: ABNORMAL
HBV CORE IGM SER-ACNC: SIGNIFICANT CHANGE UP
HBV SURFACE AG SER-ACNC: SIGNIFICANT CHANGE UP
HCV AB S/CO SERPL IA: 0.17 S/CO — SIGNIFICANT CHANGE UP (ref 0–0.99)
HCV AB SERPL-IMP: SIGNIFICANT CHANGE UP

## 2020-03-04 PROCEDURE — 99232 SBSQ HOSP IP/OBS MODERATE 35: CPT

## 2020-03-04 PROCEDURE — 99232 SBSQ HOSP IP/OBS MODERATE 35: CPT | Mod: GC

## 2020-03-04 PROCEDURE — 70543 MRI ORBT/FAC/NCK W/O &W/DYE: CPT | Mod: 26

## 2020-03-04 PROCEDURE — 70553 MRI BRAIN STEM W/O & W/DYE: CPT | Mod: 26

## 2020-03-04 RX ORDER — CHLORHEXIDINE GLUCONATE 213 G/1000ML
1 SOLUTION TOPICAL DAILY
Refills: 0 | Status: DISCONTINUED | OUTPATIENT
Start: 2020-03-05 | End: 2020-03-05

## 2020-03-04 RX ORDER — ACETAMINOPHEN 500 MG
650 TABLET ORAL ONCE
Refills: 0 | Status: COMPLETED | OUTPATIENT
Start: 2020-03-04 | End: 2020-03-04

## 2020-03-04 RX ADMIN — Medication 650 MILLIGRAM(S): at 23:49

## 2020-03-04 RX ADMIN — Medication 800 MILLIGRAM(S): at 13:30

## 2020-03-04 RX ADMIN — Medication 1 SPRAY(S): at 05:59

## 2020-03-04 RX ADMIN — Medication 2 SPRAY(S): at 23:01

## 2020-03-04 RX ADMIN — Medication 800 MILLIGRAM(S): at 06:00

## 2020-03-04 RX ADMIN — Medication 60 MILLIGRAM(S): at 05:59

## 2020-03-04 RX ADMIN — AMPICILLIN SODIUM AND SULBACTAM SODIUM 200 GRAM(S): 250; 125 INJECTION, POWDER, FOR SUSPENSION INTRAMUSCULAR; INTRAVENOUS at 17:36

## 2020-03-04 RX ADMIN — AMPICILLIN SODIUM AND SULBACTAM SODIUM 200 GRAM(S): 250; 125 INJECTION, POWDER, FOR SUSPENSION INTRAMUSCULAR; INTRAVENOUS at 23:00

## 2020-03-04 RX ADMIN — AMPICILLIN SODIUM AND SULBACTAM SODIUM 200 GRAM(S): 250; 125 INJECTION, POWDER, FOR SUSPENSION INTRAMUSCULAR; INTRAVENOUS at 12:28

## 2020-03-04 RX ADMIN — Medication 1 SPRAY(S): at 17:36

## 2020-03-04 RX ADMIN — Medication 800 MILLIGRAM(S): at 00:00

## 2020-03-04 RX ADMIN — OXYMETAZOLINE HYDROCHLORIDE 1 SPRAY(S): 0.5 SPRAY NASAL at 17:36

## 2020-03-04 RX ADMIN — PANTOPRAZOLE SODIUM 40 MILLIGRAM(S): 20 TABLET, DELAYED RELEASE ORAL at 17:37

## 2020-03-04 RX ADMIN — Medication 2 SPRAY(S): at 05:59

## 2020-03-04 RX ADMIN — PANTOPRAZOLE SODIUM 40 MILLIGRAM(S): 20 TABLET, DELAYED RELEASE ORAL at 06:00

## 2020-03-04 RX ADMIN — Medication 800 MILLIGRAM(S): at 06:45

## 2020-03-04 RX ADMIN — AMPICILLIN SODIUM AND SULBACTAM SODIUM 200 GRAM(S): 250; 125 INJECTION, POWDER, FOR SUSPENSION INTRAMUSCULAR; INTRAVENOUS at 05:59

## 2020-03-04 RX ADMIN — OXYMETAZOLINE HYDROCHLORIDE 1 SPRAY(S): 0.5 SPRAY NASAL at 05:59

## 2020-03-04 RX ADMIN — Medication 2 SPRAY(S): at 12:29

## 2020-03-04 RX ADMIN — Medication 800 MILLIGRAM(S): at 12:30

## 2020-03-04 NOTE — PROGRESS NOTE ADULT - PROBLEM SELECTOR PLAN 1
- C/w unasyn   - nasal saline 2 sprays to b/l nares tid   - afrin 2 sprays/nostril tid x 4 days only  - HOB elevation   - will continue to follow.

## 2020-03-04 NOTE — PROGRESS NOTE ADULT - SUBJECTIVE AND OBJECTIVE BOX
PRE OPERATIVE NOTE    Pre-op Diagnosis:   Procedure: Left Temporal Artery Biopsy possible Right.   Surgeon: Dr. Tan                          13.4   7.59  )-----------( 175      ( 03 Mar 2020 07:09 )             41.4       140  |  104  |  11  ----------------------------<  95  3.9   |  23  |  0.70    Ca    9.6      03 Mar 2020 07:02    TPro  7.2  /  Alb  3.7  /  TBili  0.6  /  DBili  x   /  AST  12  /  ALT  18  /  AlkPhos  76  03-03    LIVER FUNCTIONS - ( 03 Mar 2020 07:02 )  Alb: 3.7 g/dL / Pro: 7.2 g/dL / ALK PHOS: 76 U/L / ALT: 18 U/L / AST: 12 U/L / GGT: x             EKG: [X]    A/P: 59y Male planned for above procedure  NPO past midnight, except medications  IVF  Pain/nausea control  AM labs  Consent in chart    C Germain ADAMS  P [phone]

## 2020-03-04 NOTE — PROGRESS NOTE ADULT - SUBJECTIVE AND OBJECTIVE BOX
LUIS ALBERTO YOMI  [id number]    INTERVAL HPI/OVERNIGHT EVENTS:    Pt was seen and examined at the bedside. denies any new complaint. reports sigifiacant improvement in his headache with slight tenderness over scalp.    MEDICATIONS  (STANDING):  ampicillin/sulbactam  IVPB 3 Gram(s) IV Intermittent every 6 hours  ampicillin/sulbactam  IVPB      fluticasone propionate 50 MICROgram(s)/spray Nasal Spray 1 Spray(s) Both Nostrils two times a day  oxymetazoline 0.05% Nasal Spray 1 Spray(s) Both Nostrils two times a day  pantoprazole    Tablet 40 milliGRAM(s) Oral two times a day  predniSONE   Tablet 60 milliGRAM(s) Oral daily  sodium chloride 0.65% Nasal 2 Spray(s) Both Nostrils three times a day      Vital Signs Last 24 Hrs  T(C): 36.4 (04 Mar 2020 13:04), Max: 36.8 (03 Mar 2020 21:31)  T(F): 97.5 (04 Mar 2020 13:04), Max: 98.3 (03 Mar 2020 21:31)  HR: 102 (04 Mar 2020 13:04) (92 - 102)  BP: 125/82 (04 Mar 2020 13:04) (120/72 - 133/78)  BP(mean): 8 (04 Mar 2020 13:04) (8 - 8)  RR: 18 (04 Mar 2020 13:04) (18 - 18)  SpO2: 96% (04 Mar 2020 13:04) (95% - 96%)    Physical Exam:  General: No apparent distress  HEENT: scalp tenderness, left temporal tenderness. decreased left TA pulse compared to right side  CVS: +S1/S2, RRR, no murmurs/rubs/gallops  Resp: CTA b/l. No crackles/wheezing  GI: Soft, NT/ND +BS  MSK: no synovitis   Neuro: AAOx3  Skin: no visible rashes    LABS:                        13.4   7.59  )-----------( 175      ( 03 Mar 2020 07:09 )             41.4     03-03    140  |  104  |  11  ----------------------------<  95  3.9   |  23  |  0.70    Ca    9.6      03 Mar 2020 07:02    TPro  7.2  /  Alb  3.7  /  TBili  0.6  /  DBili  x   /  AST  12  /  ALT  18  /  AlkPhos  76  03-03            RADIOLOGY & ADDITIONAL TESTS: LUIS ALBERTO DE LA CRUZREADY  [id number]    INTERVAL HPI/OVERNIGHT EVENTS:    Pt was seen and examined at the bedside. denies any new complaint. reports sigifiacant improvement in his headache with slight tenderness over scalp.    MEDICATIONS  (STANDING):  ampicillin/sulbactam  IVPB 3 Gram(s) IV Intermittent every 6 hours  ampicillin/sulbactam  IVPB      fluticasone propionate 50 MICROgram(s)/spray Nasal Spray 1 Spray(s) Both Nostrils two times a day  oxymetazoline 0.05% Nasal Spray 1 Spray(s) Both Nostrils two times a day  pantoprazole    Tablet 40 milliGRAM(s) Oral two times a day  predniSONE   Tablet 60 milliGRAM(s) Oral daily  sodium chloride 0.65% Nasal 2 Spray(s) Both Nostrils three times a day      Vital Signs Last 24 Hrs  T(C): 36.4 (04 Mar 2020 13:04), Max: 36.8 (03 Mar 2020 21:31)  T(F): 97.5 (04 Mar 2020 13:04), Max: 98.3 (03 Mar 2020 21:31)  HR: 102 (04 Mar 2020 13:04) (92 - 102)  BP: 125/82 (04 Mar 2020 13:04) (120/72 - 133/78)  BP(mean): 8 (04 Mar 2020 13:04) (8 - 8)  RR: 18 (04 Mar 2020 13:04) (18 - 18)  SpO2: 96% (04 Mar 2020 13:04) (95% - 96%)    Physical Exam:  General: No apparent distress  HEENT: improved scalp tenderness, no left temporal tenderness.   CVS: +S1/S2, RRR, no murmurs/rubs/gallops  Resp: CTA b/l. No crackles/wheezing  GI: Soft, NT/ND +BS  MSK: no synovitis   Neuro: AAOx3  Skin: no visible rashes    LABS:                        13.4   7.59  )-----------( 175      ( 03 Mar 2020 07:09 )             41.4     03-03    140  |  104  |  11  ----------------------------<  95  3.9   |  23  |  0.70    Ca    9.6      03 Mar 2020 07:02    TPro  7.2  /  Alb  3.7  /  TBili  0.6  /  DBili  x   /  AST  12  /  ALT  18  /  AlkPhos  76  03-03        RADIOLOGY & ADDITIONAL TESTS:  < from: MR Orbits w/wo IV Cont (03.04.20 @ 09:36) >  Mild left medial preseptal soft tissue swelling which may represent preseptal cellulitis. No evidence of post septal orbital cellulitis.    Significant opacification of the ethmoid and sphenoid sinuses suggesting sinus inflammatory changes. No extension into the orbits or cavernous sinuses.    < end of copied text >

## 2020-03-04 NOTE — PROGRESS NOTE ADULT - SUBJECTIVE AND OBJECTIVE BOX
INTERVAL HISTORY:    Patient interviewed and examined at the bedside on the morning of 3/4/20. Patient in no acute distress, does not harbor any pressing complaints    PAST MEDICAL & SURGICAL HISTORY:  KARLA (obstructive sleep apnea): per criteria  History of cataract: 6/2019   Right: surgically treated  Deviated nasal septum: &#x27;99   surgically treated  Bilateral inguinal hernia  Fatty liver  Umbilical hernia: &#x27; 2016  surgically treated  S/P cataract extraction: 6/2019   Right  S/P colonoscopy: &#x27; 16  Negative  History of umbilical hernia repair: &#x27; 2016  with Mesh  H/O nasal septoplasty: &#x27;99    FAMILY HISTORY:  Family history of metastatic neoplastic disease: Mother    SOCIAL HISTORY:   T/E/D:   Occupation:   Lives with:     MEDICATIONS (HOME):  Home Medications:  biotin: 1 tab(s) orally once a day (09 Jul 2019 12:08)  Multiple Vitamins oral capsule: 1 cap(s) orally once a day (09 Jul 2019 12:08)    MEDICATIONS  (STANDING):  ampicillin/sulbactam  IVPB 3 Gram(s) IV Intermittent every 6 hours  ampicillin/sulbactam  IVPB      fluticasone propionate 50 MICROgram(s)/spray Nasal Spray 1 Spray(s) Both Nostrils two times a day  ibuprofen  Tablet. 800 milliGRAM(s) Oral three times a day  oxymetazoline 0.05% Nasal Spray 1 Spray(s) Both Nostrils two times a day  pantoprazole    Tablet 40 milliGRAM(s) Oral two times a day  predniSONE   Tablet 60 milliGRAM(s) Oral daily  sodium chloride 0.65% Nasal 2 Spray(s) Both Nostrils three times a day      ALLERGIES/INTOLERANCES:  Allergies  No Known Allergies    VITALS & EXAMINATION:  Vital Signs Last 24 Hrs  T(C): 36.4 (04 Mar 2020 13:04), Max: 36.8 (03 Mar 2020 21:31)  T(F): 97.5 (04 Mar 2020 13:04), Max: 98.3 (03 Mar 2020 21:31)  HR: 102 (04 Mar 2020 13:04) (92 - 102)  BP: 125/82 (04 Mar 2020 13:04) (120/72 - 133/78)  BP(mean): 8 (04 Mar 2020 13:04) (8 - 8)  RR: 18 (04 Mar 2020 13:04) (18 - 18)  SpO2: 96% (04 Mar 2020 13:04) (95% - 96%)    General:  Constitutional: Male, appears stated age, in no apparent distress including pain  Head: Normocephalic & atraumatic.  Extremities: No cyanosis, clubbing, or edema.  Skin: No rashes, bruising, or discoloration.    Neurological (>12):  MS: Awake, alert, oriented to person, place, situation, time. Normal affect. Follows all commands.    Language: Speech is clear, fluent with good repetition & comprehension (able to name objects)    CNs: PERRLA (R = 3mm, L = 3mm). VFF 20/25, 20/25 OU. EOMI, intermittent exophoria in ODno nystagmus, no diplopia. V1-3 intact to LT/pinprick, well developed masseter muscles b/l. No facial asymmetry b/l, full eye closure strength b/l. Hearing grossly normal (rubbing fingers) b/l. Symmetric palate elevation in midline. Gag reflex deferred. Head turning & shoulder shrug intact b/l. Tongue midline, normal movements, no atrophy.    Motor: Normal muscle bulk & tone. No noticeable tremor or seizure. No pronator drift.    Sensation: Intact to LT/PP/Temp/Vibration/Position b/l throughout.     Cortical: Extinction on DSS (neglect): none      LABORATORY:  CBC                       13.4   7.59  )-----------( 175      ( 03 Mar 2020 07:09 )             41.4     Chem 03-03    140  |  104  |  11  ----------------------------<  95  3.9   |  23  |  0.70    Ca    9.6      03 Mar 2020 07:02    TPro  7.2  /  Alb  3.7  /  TBili  0.6  /  DBili  x   /  AST  12  /  ALT  18  /  AlkPhos  76  03-03    LFTs LIVER FUNCTIONS - ( 03 Mar 2020 07:02 )  Alb: 3.7 g/dL / Pro: 7.2 g/dL / ALK PHOS: 76 U/L / ALT: 18 U/L / AST: 12 U/L / GGT: x           Coagulopathy PT/INR - ( 02 Mar 2020 15:49 )   PT: 13.1 sec;   INR: 1.13 ratio         PTT - ( 02 Mar 2020 15:49 )  PTT:32.4 sec      Radiology (XR, CT, MR, U/S, TTE/HAILEY):    < from: MR Head w/wo IV Cont (03.04.20 @ 09:35) >  IMPRESSION:    Mild left medial preseptal soft tissue swelling which may represent preseptal cellulitis. No evidence of post septal orbital cellulitis.    Significant opacification of the ethmoid and sphenoid sinuses suggesting sinus inflammatory changes. No extension into the orbits or cavernous sinuses.    < end of copied text >

## 2020-03-04 NOTE — PROGRESS NOTE ADULT - ASSESSMENT
58 y/o male with no significant pmh , one episode of migraine HA years ago ( not similar to current presnetation) sent for evaluation for persistent LAMBERT from 's office.  Reports frontal HA, constant with no N/V/Fever or chills / no rashes.. HA is at times throbbing but overall dull and constant.. worse upon changing position as he lays on R but worse as he lays on L and much worse the first few min when he stands.. no nuchal rigidindity or neck pain   no ear pain .. associated photophobia hwoever seems to have that at baseline though seems to be worse now.   motrin helped somewhat yesterday with pain...   in addition to his HA he noted L eye pain ( though mild )  discharge from eye ( thicker than tears ) noted today.  in ED vitally stable afeb   CT : neg for vein thrombosis .   moderate elevation of eSR   mild hyponatremia 133     - HA : with photophobia but no fever   discused w/ optho : scalp tenderness is not explained by sinusitis .. pt with temptoral tenderness and ? decreased pulses   vascular input noted : will plan bx ...  cont empiric steroids for now   appreciate rheum input   neuro input appreciated : check MR for HA   NSAIDS for pain     - hyponatremia : mild  improved     - sinusitis : cont abx   MR shows preseptal cellulitis: pt already on unasyn .. cont same for now

## 2020-03-04 NOTE — PROGRESS NOTE ADULT - SUBJECTIVE AND OBJECTIVE BOX
ENT ISSUE/POD: sinusitis     HPI: 59y man with a PMH of KARLA, cataracts s/p cataract surgery, nasal septum deviation s/p repair who presented on 03-02-20 with severe HA x 7-10 days. Found to have sinusitis on CT. Nasal endoscopy performed showed drainage from left middle meatus and polyp. Pt started on unasyn, afrin, flonase and nasal saline. Today, pt states that he is feeling better, no longer complaining of HA. Pt denies congestion, facial pain, facial tenderness, rhinorrhea, PND or changes in vision.         PAST MEDICAL & SURGICAL HISTORY:  KARLA (obstructive sleep apnea): per criteria  History of cataract: 6/2019   Right: surgically treated  Deviated nasal septum: &#x27;99   surgically treated  Bilateral inguinal hernia  Fatty liver  Umbilical hernia: &#x27; 2016  surgically treated  S/P cataract extraction: 6/2019   Right  S/P colonoscopy: &#x27; 16  Negative  History of umbilical hernia repair: &#x27; 2016  with Mesh  H/O nasal septoplasty: &#x27;99    Allergies    No Known Allergies    Intolerances      MEDICATIONS  (STANDING):  ampicillin/sulbactam  IVPB 3 Gram(s) IV Intermittent every 6 hours  ampicillin/sulbactam  IVPB      fluticasone propionate 50 MICROgram(s)/spray Nasal Spray 1 Spray(s) Both Nostrils two times a day  ibuprofen  Tablet. 800 milliGRAM(s) Oral three times a day  oxymetazoline 0.05% Nasal Spray 1 Spray(s) Both Nostrils two times a day  pantoprazole    Tablet 40 milliGRAM(s) Oral two times a day  predniSONE   Tablet 60 milliGRAM(s) Oral daily  sodium chloride 0.65% Nasal 2 Spray(s) Both Nostrils three times a day    MEDICATIONS  (PRN):      Social History: see consult    Family history: see consult    ROS:   ENT: all negative except as noted in HPI   Pulm: denies SOB, cough, hemoptysis  Neuro: denies numbness/tingling, loss of sensation  Endo: denies heat/cold intolerance, excessive sweating      Vital Signs Last 24 Hrs  T(C): 36.3 (04 Mar 2020 06:10), Max: 36.8 (03 Mar 2020 21:31)  T(F): 97.3 (04 Mar 2020 06:10), Max: 98.3 (03 Mar 2020 21:31)  HR: 92 (04 Mar 2020 06:10) (92 - 112)  BP: 120/72 (04 Mar 2020 06:10) (108/69 - 133/78)  BP(mean): --  RR: 18 (04 Mar 2020 06:10) (18 - 18)  SpO2: 96% (04 Mar 2020 06:10) (95% - 97%)                          13.4   7.59  )-----------( 175      ( 03 Mar 2020 07:09 )             41.4    03-03    140  |  104  |  11  ----------------------------<  95  3.9   |  23  |  0.70    Ca    9.6      03 Mar 2020 07:02    TPro  7.2  /  Alb  3.7  /  TBili  0.6  /  DBili  x   /  AST  12  /  ALT  18  /  AlkPhos  76  03-03   PT/INR - ( 02 Mar 2020 15:49 )   PT: 13.1 sec;   INR: 1.13 ratio         PTT - ( 02 Mar 2020 15:49 )  PTT:32.4 sec    PHYSICAL EXAM:  Gen: NAD  Skin: No rashes, bruises, or lesions  Head: Normocephalic, Atraumatic  Face: no edema, erythema, or fluctuance. Parotid glands soft without mass, no temporal tenderness.  Eyes: no scleral injection, right eye veers lateral, EOM normal  Nose: Nares bilaterally patent, no discharge  Mouth: No Stridor / Drooling / Trismus.  Mucosa moist, tongue/uvula midline, oropharynx clear  Neck: Flat, supple, no lymphadenopathy, trachea midline, no masses  Lymphatic: No lymphadenopathy  Resp: breathing easily, no stridor  CV: no peripheral edema/cyanosis  GI: nondistended   Peripheral vascular: no JVD or edema  Neuro: facial nerve intact, no facial droop

## 2020-03-04 NOTE — PROGRESS NOTE ADULT - ATTENDING COMMENTS
Pt seen and examined on rounds. Pt feels much better since admission. left temporal pain and headache have resolved.   Has improved eye movements today.   MRI shows preseptal edema, which they are saying could be consistent with cellulitis. Pt does not have erythema or swelling on exam, so less likely.
I have interviewed and examined the patient and reviewed the residents note including the history, exam, assessment, and plan.  I agree with the residents assessment and plan.    59y male w/ pmhx/ochx of cataract extraction in right eye consulted for 1 wk of headaches, found to have normal eye exam.  Symptoms of HA and temporal pain improved from yesterday after steroids.    - continue steroids per rheum  - Although headache can be caused by the sinus, scalp tenderness on left side with temporal tenderness doesn't really correlate with sinusitis.   - Vascular surgery for Temporal artery biopsy, within 1 week of starting steroids   - GI ppx when on steroids.   - today HA is much improved and feeling better overall. 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   - no double vision, full extraocular movements, and VA excellent in both eyes, likely not anything acute.   - MRI head with no abnormalities, showing preseptal cellulitis, on abx.   - monitoring as outpatient.   - case discussed with Dr. Victor    Outpatient follow-up: Patient should follow-up with his/her ophthalmologist or with [facility] Department of Ophthalmology within 1 week of after discharge  Keisha Tracey MD

## 2020-03-04 NOTE — PROGRESS NOTE ADULT - ASSESSMENT
59y with +b/l sinusitis, nasal endoscope reveals drainage and polyp on left side. Pt feeling better today. No longer complaining of facial / temporal pain.

## 2020-03-04 NOTE — PROGRESS NOTE ADULT - SUBJECTIVE AND OBJECTIVE BOX
Patient is a 59y old  Male who presents with a chief complaint of HA etc. (03 Mar 2020 09:09)                                                               INTERVAL HPI/OVERNIGHT EVENTS:    REVIEW OF SYSTEMS:     CONSTITUTIONAL: No weakness, fevers or chills  RESPIRATORY: No cough, wheezing,  No shortness of breath  CARDIOVASCULAR: No chest pain or palpitations  GASTROINTESTINAL: No abdominal pain  . No nausea, vomiting, or hematemesis; No diarrhea or constipation. No melena or hematochezia.  GENITOURINARY: No dysuria, frequency or hematuria  NEUROLOGICAL: No numbness or weakness                                                                                                                                                                                                                                                                                Medications:  MEDICATIONS  (STANDING):  ampicillin/sulbactam  IVPB 3 Gram(s) IV Intermittent every 6 hours  ampicillin/sulbactam  IVPB      fluticasone propionate 50 MICROgram(s)/spray Nasal Spray 1 Spray(s) Both Nostrils two times a day  ibuprofen  Tablet. 800 milliGRAM(s) Oral three times a day  oxymetazoline 0.05% Nasal Spray 1 Spray(s) Both Nostrils two times a day  pantoprazole    Tablet 40 milliGRAM(s) Oral two times a day  predniSONE   Tablet 60 milliGRAM(s) Oral daily  sodium chloride 0.65% Nasal 2 Spray(s) Both Nostrils three times a day    MEDICATIONS  (PRN):       Allergies    No Known Allergies    Intolerances      Vital Signs Last 24 Hrs  T(C): 36.4 (04 Mar 2020 13:04), Max: 36.8 (03 Mar 2020 21:31)  T(F): 97.5 (04 Mar 2020 13:04), Max: 98.3 (03 Mar 2020 21:31)  HR: 102 (04 Mar 2020 13:04) (92 - 102)  BP: 125/82 (04 Mar 2020 13:04) (120/72 - 133/78)  BP(mean): 8 (04 Mar 2020 13:04) (8 - 8)  RR: 18 (04 Mar 2020 13:04) (18 - 18)  SpO2: 96% (04 Mar 2020 13:04) (95% - 96%)  CAPILLARY BLOOD GLUCOSE          03-03 @ 07:01  -  03-04 @ 07:00  --------------------------------------------------------  IN: 120 mL / OUT: 0 mL / NET: 120 mL      Physical Exam:    Daily     Daily   General:  Well appearing, NAD, not cachetic  HEENT:  Nonicteric, PERRLA  CV:  RRR, S1S2   Lungs:  CTA B/L, no wheezes, rales, rhonchi  Abdomen:  Soft, non-tender, no distended, positive BS  Extremities:  2+ pulses, no c/c, no edema  Skin:  Warm and dry, no rashes  :  No daniels  Neuro:  AAOx3, non-focal, grossly intact                                                                                                                                                                                                                                                                                                LABS:                               13.4   7.59  )-----------( 175      ( 03 Mar 2020 07:09 )             41.4                      03-03    140  |  104  |  11  ----------------------------<  95  3.9   |  23  |  0.70    Ca    9.6      03 Mar 2020 07:02    TPro  7.2  /  Alb  3.7  /  TBili  0.6  /  DBili  x   /  AST  12  /  ALT  18  /  AlkPhos  76  03-03

## 2020-03-04 NOTE — PROGRESS NOTE ADULT - ASSESSMENT
58 yo male with headache x 7-10 days , found to have acute sinusitis on exam and imaging, which explains his symptoms. Discussed with ENT.   Rheumatology was called for concern for GCA, which seems less likely given the existing diagnosis of acute sinusitis which can explain his symptoms. In addition would expect higher ESR in GCA. Eye exam also reported normal by ophthalmology team.  However in view of decreased left TA pulse and scalp/ left temporal tenderness on exam, GCA remains in the differential despite low likelihood (in the absence of constitutional sx, borderline ESR...)  Reviewed above with patient and wife at bedside and that the gold standard diagnostic test for GCA is TA , patient would like to pursue the biopsy.  Significant response to steroid     - C/w prednisone 60 mg po daily, can be discharged on the same dose after TA biopsy  - GI PPX   - Follow with  vascular surgery team for TA biopsy scheduling   - Antibiotics as per ID and ENT  - Pt to follow with Dr Peng Nur at [address] [facility] [address] [place] [address] ( [phone])    Rheum will follow  Case was seen and discussed with Dr. Peng Sloan MD  Rheum fellow PGY 4   Pager [phone] 60 yo male with headache x 7-10 days , found to have acute sinusitis on exam and imaging.  Rheumatology was called for concern for GCA, which seems less likely given the existing diagnosis of acute sinusitis which can explain his symptoms. In addition would expect higher ESR in GCA. Eye exam also reported as normal by ophthalmology team.  However in view of decreased left TA pulse and scalp/ left temporal tenderness on exam, GCA remains in the differential despite low likelihood (in the absence of constitutional sx, borderline ESR...)  Reviewed above with patient and wife at bedside and that the gold standard diagnostic test for GCA is TAB , patient would like to pursue the biopsy.    Significant improvement noted today with resolution of headache and temporal tenderness s.p steroids (but also antibiotics)    - C/w prednisone 60 mg po daily, can be discharged on the same dose after TA biopsy  further tapering recommendations based on the biopsy results   - GI PPX   - TAB scheduled for tomorrow   - Antibiotics as per ID and ENT  - Pt to follow with Dr Peng Nur at [address] [facility] [address] [place] [address] ( [phone]) in 2 weeks     Rheum will follow  Case was seen and discussed with Dr. Peng Sloan MD  Rheum fellow PGY 4   Pager [phone]

## 2020-03-04 NOTE — PROGRESS NOTE ADULT - SUBJECTIVE AND OBJECTIVE BOX
[facility] DEPARTMENT OF OPHTHALMOLOGY - INITIAL ADULT CONSULT  -----------------------------------------------------------------------------------------------------------------  Ella Suyeon Yu, MD PGY 2  Pager: [phone]  -----------------------------------------------------------------------------------------------------------------    HPI:  60 y/o male with no significant pmh , one episode of migraine HA years ago ( not similar to current presnetation) sent for evaluation for persistent LAMBERT from 's office.  Reports frontal HA, constant with no N/V/Fever or chills / no rashes.. HA is at times throbbing but overall dull and constant.. worse upon changing position as he lays on R but worse as he lays on L and much worse the first few min when he stands.. no nuchal rigidindity or neck pain   no ear pain .. associated photophobia hwoever seems to have that at baseline though seems to be worse now.   motrin helped somewhat yesterday with pain...   in addition to his HA he noted L eye pain ( though mild )  discharge from eye ( thicker than tears ) noted today.  he had recieved mucinex for possible sinusitis hwoever with no help over the past week   in ED vitally stable afeb     CT : neg for vein thrombosis .   moderate elevation of eSR   mild hyponatremia 133 (02 Mar 2020 21:04)    Interval History: 58 YO M with no PMH coming in for HA ~1 week. Reports HA is frontal, but wraps around the head like a band, sometimes left side is worse. No N/V. States HA is dull and constant and improves somewhat with advil but is constant all day. Associated with photophobia, but states that's his baseline, thinks photophobia maybe somewhat worse since 1 week ago. Tried mucinex, tylenol, and advil, with minimal improvement. No flashes/floaters. No vision changes. Exquisite scalp tenderness left scalp, slight tenderness left forehead/temple area. No tenderness Right scalp or forehead. No jaw claudication, no joint pains, weight loss recently but states that was intentional.     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 CEIOL 1yr ago OD. 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 800 milliGRAM(s) Oral three times a day  pantoprazole    Tablet 40 milliGRAM(s) Oral two times a day  predniSONE   Tablet 60 milliGRAM(s) Oral once    MEDICATIONS  (PRN):    Allergies & Intolerances: None    Review of Systems:  Constitutional: No fever, chills  Eyes: No blurry vision, flashes, floaters, FBS, erythema, discharge, double vision, OU  Neuro: No tremors  Cardiovascular: No chest pain, palpitations  Respiratory: No SOB, no cough  GI: No nausea, vomiting, abdominal pain  : No dysuria  Skin: no rash  Psych: no depression  Endocrine: no polyuria, polydipsia  Heme/lymph: no swelling    VITALS: T(C): 36.7 (03-03-20 @ 04:41)  T(F): 98 (03-03-20 @ 04:41), Max: 99.5 (03-02-20 @ 17:03)  HR: 114 (03-03-20 @ 04:41) (85 - 125)  BP: 144/81 (03-03-20 @ 04:41) (128/79 - 144/81)  RR:  (16 - 18)  SpO2:  (96% - 99%)  Wt(kg): --  General: AAO x 3, appropriate mood and affect    Ophthalmology Exam:  Visual acuity (sc): 20/20, 20/20-2 OU  Pupils: PERRL OU, no APD  Ttono: 10, 15 OU  Extraocular movements (EOMs): Full OU, no pain, no diplopia  Confrontational Visual Field (CVF): Full OU  Color Plates: 12/12 OU    Slit Lamp Exam   External: Flat OU  Lids/Lashes/Lacrimal Ducts: Flat OU    Sclera/Conjunctiva: W+Q OU  Cornea: Cl OU  Anterior Chamber: D+Q OU    Iris: Flat OU  Lens: PCIOL OD, NS OS    Fundus Exam: dilated with 1% tropicamide and 2.5% phenylephrine  Approval obtained from primary team for dilation  Patient aware that pupils can remained dilated for at least 4-6 hours  Exam performed with 20D lens    Vitreous: wnl OU  Disc, cup/disc: sharp and pink, 0.2 OU, no hyperemia, no swelling  Macula: wnl OU  Vessels: wnl OU  Periphery: wnl OU    Labs/Imaging:      < from: CT Angio Head w/ IV Cont (03.02.20 @ 18:15) >    IMPRESSION:     CT head:    No acute intracranial hemorrhage, mass effect, vasogenic edema, or evidence of acute territorial infarct.    Air-fluid levels in the bilateral maxillary and ethmoid sinuses. Complete opacificationof the sphenoid sinus. Correlate for the presence of acute sinusitis.    CTV brain:    No evidence for venous sinus or cortical vein thrombosis.          < end of copied text >      Assessment and Recommendation:   · Assessment		  Assessment and Recommendations:  59y male w/ pmhx/ochx of cataract extraction in right eye consulted for 1 wk of headaches, found to have normal eye exam    - with exquisite scalp tenderness, HA, and elevated ESR/CRP, Please give IV solumedrol 250mg IV q6 for 12 doses, then switch to oral pred. Steroid dosing per rheumatology.  - Although headache can be caused by the sinus, scalp tenderness on left side with temporal tenderness doesn't really correlate with sinusitis.   - Vascular surgery for Temporal artery biopsy, within 1 week of starting steroids   - GI ppx when on steroids.   - appreciate rheum recs  - d/w patient and family at bedside       Intermittent exophoria right eye  - likely chronic for at least 5-10 years, able to make it align with effort, likely exophoria.   - no double vision, full extraocular movements, and VA excellent in both eyes, likely not anything acute.   - MRI head with no abnormalities, showing preseptal cellulitis, on abx.   - monitoring as outpatient.     d/w  (Neuroophthalmology)  s/d/w Dr Tracey     Outpatient follow-up: Patient should follow-up with his/her ophthalmologist or with [facility] Department of Ophthalmology within 1 week of after discharge at:    [address]  [place], [place] [id number] [phone] [facility] DEPARTMENT OF OPHTHALMOLOGY - INITIAL ADULT CONSULT  -----------------------------------------------------------------------------------------------------------------  Ella Suyeon Yu, MD PGY 2  Pager: [phone]  -----------------------------------------------------------------------------------------------------------------    Interval Hx: Feeling much better today, no HA and no blurry vision.       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 CEIOL 1yr ago OD. 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 800 milliGRAM(s) Oral three times a day  pantoprazole    Tablet 40 milliGRAM(s) Oral two times a day  predniSONE   Tablet 60 milliGRAM(s) Oral once    MEDICATIONS  (PRN):    Allergies & Intolerances: None    Review of Systems:  Constitutional: No fever, chills  Eyes: No blurry vision, flashes, floaters, FBS, erythema, discharge, double vision, OU  Neuro: No tremors  Cardiovascular: No chest pain, palpitations  Respiratory: No SOB, no cough  GI: No nausea, vomiting, abdominal pain  : No dysuria  Skin: no rash  Psych: no depression  Endocrine: no polyuria, polydipsia  Heme/lymph: no swelling    VITALS: T(C): 36.7 (03-03-20 @ 04:41)  T(F): 98 (03-03-20 @ 04:41), Max: 99.5 (03-02-20 @ 17:03)  HR: 114 (03-03-20 @ 04:41) (85 - 125)  BP: 144/81 (03-03-20 @ 04:41) (128/79 - 144/81)  RR:  (16 - 18)  SpO2:  (96% - 99%)  Wt(kg): --  General: AAO x 3, appropriate mood and affect    Ophthalmology Exam:  Visual acuity (sc): 20/20, 20/20-2 OU  Pupils: PERRL OU, no APD  Ttono: 10, 15 OU  Extraocular movements (EOMs): Full OU, no pain, no diplopia  Confrontational Visual Field (CVF): Full OU  Color Plates: 12/12 OU    Slit Lamp Exam   External: Flat OU  Lids/Lashes/Lacrimal Ducts: Flat OU    Sclera/Conjunctiva: W+Q OU  Cornea: Cl OU  Anterior Chamber: D+Q OU    Iris: Flat OU  Lens: PCIOL OD, NS OS    Fundus Exam: dilated with 1% tropicamide and 2.5% phenylephrine  Approval obtained from primary team for dilation  Patient aware that pupils can remained dilated for at least 4-6 hours  Exam performed with 20D lens    Vitreous: wnl OU  Disc, cup/disc: sharp and pink, 0.2 OU, no hyperemia, no swelling  Macula: wnl OU  Vessels: wnl OU  Periphery: wnl OU    Labs/Imaging:      < from: CT Angio Head w/ IV Cont (03.02.20 @ 18:15) >    IMPRESSION:     CT head:    No acute intracranial hemorrhage, mass effect, vasogenic edema, or evidence of acute territorial infarct.    Air-fluid levels in the bilateral maxillary and ethmoid sinuses. Complete opacificationof the sphenoid sinus. Correlate for the presence of acute sinusitis.    CTV brain:    No evidence for venous sinus or cortical vein thrombosis.          < end of copied text >      Assessment and Recommendation:   · Assessment		  Assessment and Recommendations:  59y male w/ pmhx/ochx of cataract extraction in right eye consulted for 1 wk of headaches, found to have normal eye exam    - with exquisite scalp tenderness, HA, and elevated ESR/CRP, Please give IV solumedrol 250mg IV q6 for 12 doses, then switch to oral pred. Steroid dosing per rheumatology.  - Although headache can be caused by the sinus, scalp tenderness on left side with temporal tenderness doesn't really correlate with sinusitis.   - Vascular surgery for Temporal artery biopsy, within 1 week of starting steroids   - GI ppx when on steroids.   - appreciate rheum recs  - d/w patient and family at bedside       Intermittent exophoria right eye  - likely chronic for at least 5-10 years, able to make it align with effort, likely exophoria.   - no double vision, full extraocular movements, and VA excellent in both eyes, likely not anything acute.   - MRI head with no abnormalities, showing preseptal cellulitis, on abx.   - monitoring as outpatient.     d/w  (Neuroophthalmology)  s/d/w Dr Tracey     Outpatient follow-up: Patient should follow-up with his/her ophthalmologist or with [facility] Department of Ophthalmology within 1 week of after discharge at:    [address]  [place], [place] [id number] [phone] [facility] DEPARTMENT OF OPHTHALMOLOGY - INITIAL ADULT CONSULT  -----------------------------------------------------------------------------------------------------------------  Ella Suyeon Yu, MD PGY 2  Pager: [phone]  -----------------------------------------------------------------------------------------------------------------    Interval Hx: Feeling much better today, no HA and no blurry vision.       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 CEIOL 1yr ago OD. 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 800 milliGRAM(s) Oral three times a day  pantoprazole    Tablet 40 milliGRAM(s) Oral two times a day  predniSONE   Tablet 60 milliGRAM(s) Oral once    MEDICATIONS  (PRN):    Allergies & Intolerances: None    Review of Systems:  Constitutional: No fever, chills  Eyes: No blurry vision, flashes, floaters, FBS, erythema, discharge, double vision, OU  Neuro: No tremors  Cardiovascular: No chest pain, palpitations  Respiratory: No SOB, no cough  GI: No nausea, vomiting, abdominal pain  : No dysuria  Skin: no rash  Psych: no depression  Endocrine: no polyuria, polydipsia  Heme/lymph: no swelling    VITALS: T(C): 36.7 (03-03-20 @ 04:41)  T(F): 98 (03-03-20 @ 04:41), Max: 99.5 (03-02-20 @ 17:03)  HR: 114 (03-03-20 @ 04:41) (85 - 125)  BP: 144/81 (03-03-20 @ 04:41) (128/79 - 144/81)  RR:  (16 - 18)  SpO2:  (96% - 99%)  Wt(kg): --  General: AAO x 3, appropriate mood and affect    Ophthalmology Exam:  Visual acuity (sc): 20/25, 20/25 OU  Pupils: PERRL OU, no APD  Ttono: 10, 15 OU  Extraocular movements (EOMs): Full OU, no pain, no diplopia    PLE  External: Flat OU, temporal artery pulses stronger than yesterday OS.  Lids/Lashes/Lacrimal Ducts: Flat OU    Sclera/Conjunctiva: W+Q OU  Cornea: Cl OU  Anterior Chamber: D+Q OU    Iris: Flat OU  Lens: PCIOL OD, NS OS        Labs/Imaging:      < from: CT Angio Head w/ IV Cont (03.02.20 @ 18:15) >    IMPRESSION:     CT head:    No acute intracranial hemorrhage, mass effect, vasogenic edema, or evidence of acute territorial infarct.    Air-fluid levels in the bilateral maxillary and ethmoid sinuses. Complete opacificationof the sphenoid sinus. Correlate for the presence of acute sinusitis.    CTV brain:    No evidence for venous sinus or cortical vein thrombosis.          < end of copied text >      Assessment and Recommendation:   · Assessment		  Assessment and Recommendations:  59y male w/ pmhx/ochx of cataract extraction in right eye consulted for 1 wk of headaches, found to have normal eye exam    - with exquisite scalp tenderness, HA, and elevated ESR/CRP, Please give IV solumedrol 250mg IV q6 for 12 doses, then switch to oral pred. Steroid dosing per rheumatology.  - Although headache can be caused by the sinus, scalp tenderness on left side with temporal tenderness doesn't really correlate with sinusitis.   - Vascular surgery for Temporal artery biopsy, within 1 week of starting steroids   - GI ppx when on steroids.   - today HA is much improved and feeling better overall. Temporal artery pulse OS silghtly better than yesterday  - appreciate rheum recs  - d/w patient and family at bedside       Intermittent exophoria right eye  - likely chronic for at least 5-10 years, able to make it align with effort, likely exophoria.   - no double vision, full extraocular movements, and VA excellent in both eyes, likely not anything acute.   - MRI head with no abnormalities, showing preseptal cellulitis, on abx.   - monitoring as outpatient.     initially d/w  (Neuroophthalmology)  s/d/w Dr Tracey     Outpatient follow-up: Patient should follow-up with his/her ophthalmologist or with [facility] Department of Ophthalmology within 1 week of after discharge at:    [address]  [place] [address]  [phone] [facility] DEPARTMENT OF OPHTHALMOLOGY   -----------------------------------------------------------------------------------------------------------------  Ella Suyeon Yu, MD PGY 2  Pager: [phone]  -----------------------------------------------------------------------------------------------------------------    Interval Hx: Feeling much better today, no HA and no blurry vision.   Follow up for HA.    Review of Systems:  Constitutional: No fever, chills  Eyes: No blurry vision, flashes, floaters, FBS, erythema, discharge, double vision, OU  Neuro: No tremors  Cardiovascular: No chest pain, palpitations  Respiratory: No SOB, no cough  GI: No nausea, vomiting, abdominal pain  : No dysuria  Skin: no rash  Psych: no depression  Endocrine: no polyuria, polydipsia  Heme/lymph: no swelling    VITALS: T(C): 36.7 (03-03-20 @ 04:41)  T(F): 98 (03-03-20 @ 04:41), Max: 99.5 (03-02-20 @ 17:03)  HR: 114 (03-03-20 @ 04:41) (85 - 125)  BP: 144/81 (03-03-20 @ 04:41) (128/79 - 144/81)  RR:  (16 - 18)  SpO2:  (96% - 99%)  Wt(kg): --  General: AAO x 3, appropriate mood and affect    Ophthalmology Exam:  Visual acuity (sc): 20/25, 20/25 OU  Pupils: PERRL OU, no APD  Ttono: 10, 15 OU  Extraocular movements (EOMs): Full OU, no pain, no diplopia    PLE  External: Flat OU, temporal artery pulses stronger than yesterday OS, no tenderness OU  Lids/Lashes/Lacrimal Ducts: Flat OU    Sclera/Conjunctiva: W+Q OU  Cornea: Cl OU  Anterior Chamber: D+Q OU    Iris: Flat OU  Lens: PCIOL OD, NS OS        Labs/Imaging:      < from: CT Angio Head w/ IV Cont (03.02.20 @ 18:15) >    IMPRESSION:     CT head:    No acute intracranial hemorrhage, mass effect, vasogenic edema, or evidence of acute territorial infarct.    Air-fluid levels in the bilateral maxillary and ethmoid sinuses. Complete opacificationof the sphenoid sinus. Correlate for the presence of acute sinusitis.    CTV brain:    No evidence for venous sinus or cortical vein thrombosis.          < end of copied text >      Assessment and Recommendation:   · Assessment		  Assessment and Recommendations:  59y male w/ pmhx/ochx of cataract extraction in right eye consulted for 1 wk of headaches, found to have normal eye exam.  Symptoms of HA and temporal pain improved from yesterday after steroids.    - continue steroids per rheum  - Although headache can be caused by the sinus, scalp tenderness on left side with temporal tenderness doesn't really correlate with sinusitis.   - Vascular surgery for Temporal artery biopsy, within 1 week of starting steroids   - GI ppx when on steroids.   - today HA is much improved and feeling better overall. 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   - no double vision, full extraocular movements, and VA excellent in both eyes, likely not anything acute.   - MRI head with no abnormalities, showing preseptal cellulitis, on abx.   - monitoring as outpatient.     initially d/w  (Neuroophthalmology)  s/d/w Dr Tracey     Outpatient follow-up: Patient should follow-up with his/her ophthalmologist or with [facility] Department of Neuro-Ophthalmology within 1 week of after discharge at:    [address] [facility] [address]  [place] [address]  [phone]

## 2020-03-04 NOTE — PROGRESS NOTE ADULT - SUBJECTIVE AND OBJECTIVE BOX
Anesthesia Preop for Left Temporal Artery biopsy for GCA    Patient seen, examined, chart reviewed. NPO after midnight for OR on 3/5/2020.

## 2020-03-04 NOTE — PROGRESS NOTE ADULT - ASSESSMENT
59y R-handed M with h/o KARLA, cataracts and deviated nasal septum p/w bifrontal throbbing HA worse with position change (worse on L. lat recum>R.lat recum) a/w some swelling of the L. periorbital region, conjunctival injection and tearing.     On exam, he has no focal tenderness to palpation of either temporal artery, has a right eye which skews laterally and vertically on up gaze (apparently chronic per PT since 1 yr) and is reporting no further HA.  OD exophoric on primary gaze intermittently (corrects to midline and then skews out again).  No other focal neurologic deficit.       Impression: Throbbing B/L Frontal HA a/w conjunctival injection, tearing, worsening on laying left with elevated ESR possibly secondary to GCA      Recommendations    -F/U rheumatology recs for possible GCA  -MRI Brain and Orbits unremarkable  -No further inpatient neurological work up required  -Can follow up outpatient at [address] [place] [id number] 59y R-handed M with h/o KARLA, cataracts and deviated nasal septum p/w bifrontal throbbing HA worse with position change (worse on L. lat recum>R.lat recum) a/w some swelling of the L. periorbital region, conjunctival injection and tearing.     On exam, he has no focal tenderness to palpation of either temporal artery, has a right eye which skews laterally and vertically on up gaze (apparently chronic per PT since 1 yr) and is reporting no further HA.  OD exophoric on primary gaze intermittently (corrects to midline and then skews out again).  No other focal neurologic deficit.       Impression: Throbbing B/L Frontal HA a/w conjunctival injection, tearing, worsening on laying left with elevated ESR possibly secondary to GCA      Recommendations  - Planned for temporal artery biopsy in AM.   -F/U rheumatology recs for possible GCA  -MRI Brain and Orbits unremarkable  -No further inpatient neurological work up required  -Can follow up outpatient at [address] [place] [address] [phone]

## 2020-03-05 ENCOUNTER — TRANSCRIPTION ENCOUNTER (OUTPATIENT)
Age: 60
End: 2020-03-05

## 2020-03-05 ENCOUNTER — RESULT REVIEW (OUTPATIENT)
Age: 60
End: 2020-03-05

## 2020-03-05 VITALS
DIASTOLIC BLOOD PRESSURE: 75 MMHG | RESPIRATION RATE: 18 BRPM | SYSTOLIC BLOOD PRESSURE: 124 MMHG | HEART RATE: 69 BPM | TEMPERATURE: 98 F | OXYGEN SATURATION: 97 %

## 2020-03-05 LAB
ANION GAP SERPL CALC-SCNC: 13 MMOL/L — SIGNIFICANT CHANGE UP (ref 5–17)
APTT BLD: 24.3 SEC — LOW (ref 27.5–36.3)
BLD GP AB SCN SERPL QL: NEGATIVE — SIGNIFICANT CHANGE UP
BUN SERPL-MCNC: 22 MG/DL — SIGNIFICANT CHANGE UP (ref 7–23)
CALCIUM SERPL-MCNC: 8.9 MG/DL — SIGNIFICANT CHANGE UP (ref 8.4–10.5)
CHLORIDE SERPL-SCNC: 106 MMOL/L — SIGNIFICANT CHANGE UP (ref 96–108)
CO2 SERPL-SCNC: 22 MMOL/L — SIGNIFICANT CHANGE UP (ref 22–31)
CREAT SERPL-MCNC: 0.76 MG/DL — SIGNIFICANT CHANGE UP (ref 0.5–1.3)
GAMMA INTERFERON BACKGROUND BLD IA-ACNC: 0.01 IU/ML — SIGNIFICANT CHANGE UP
GLUCOSE SERPL-MCNC: 109 MG/DL — HIGH (ref 70–99)
HCT VFR BLD CALC: 36.4 % — LOW (ref 39–50)
HGB BLD-MCNC: 11.9 G/DL — LOW (ref 13–17)
INR BLD: 1.1 RATIO — SIGNIFICANT CHANGE UP (ref 0.88–1.16)
M TB IFN-G BLD-IMP: ABNORMAL
M TB IFN-G CD4+ BCKGRND COR BLD-ACNC: 0 IU/ML — SIGNIFICANT CHANGE UP
M TB IFN-G CD4+CD8+ BCKGRND COR BLD-ACNC: 0 IU/ML — SIGNIFICANT CHANGE UP
MAGNESIUM SERPL-MCNC: 2.1 MG/DL — SIGNIFICANT CHANGE UP (ref 1.6–2.6)
MCHC RBC-ENTMCNC: 28.4 PG — SIGNIFICANT CHANGE UP (ref 27–34)
MCHC RBC-ENTMCNC: 32.7 GM/DL — SIGNIFICANT CHANGE UP (ref 32–36)
MCV RBC AUTO: 86.9 FL — SIGNIFICANT CHANGE UP (ref 80–100)
NRBC # BLD: 0 /100 WBCS — SIGNIFICANT CHANGE UP (ref 0–0)
PHOSPHATE SERPL-MCNC: 3.5 MG/DL — SIGNIFICANT CHANGE UP (ref 2.5–4.5)
PLATELET # BLD AUTO: 194 K/UL — SIGNIFICANT CHANGE UP (ref 150–400)
POTASSIUM SERPL-MCNC: 4.1 MMOL/L — SIGNIFICANT CHANGE UP (ref 3.5–5.3)
POTASSIUM SERPL-SCNC: 4.1 MMOL/L — SIGNIFICANT CHANGE UP (ref 3.5–5.3)
PROTHROM AB SERPL-ACNC: 12.6 SEC — SIGNIFICANT CHANGE UP (ref 10–13.1)
QUANT TB PLUS MITOGEN MINUS NIL: 0.15 IU/ML — SIGNIFICANT CHANGE UP
RBC # BLD: 4.19 M/UL — LOW (ref 4.2–5.8)
RBC # FLD: 12.4 % — SIGNIFICANT CHANGE UP (ref 10.3–14.5)
RH IG SCN BLD-IMP: POSITIVE — SIGNIFICANT CHANGE UP
SODIUM SERPL-SCNC: 141 MMOL/L — SIGNIFICANT CHANGE UP (ref 135–145)
WBC # BLD: 10.64 K/UL — HIGH (ref 3.8–10.5)
WBC # FLD AUTO: 10.64 K/UL — HIGH (ref 3.8–10.5)

## 2020-03-05 PROCEDURE — 80053 COMPREHEN METABOLIC PANEL: CPT

## 2020-03-05 PROCEDURE — 96374 THER/PROPH/DIAG INJ IV PUSH: CPT | Mod: XU

## 2020-03-05 PROCEDURE — 96375 TX/PRO/DX INJ NEW DRUG ADDON: CPT | Mod: XU

## 2020-03-05 PROCEDURE — 85610 PROTHROMBIN TIME: CPT

## 2020-03-05 PROCEDURE — 70496 CT ANGIOGRAPHY HEAD: CPT

## 2020-03-05 PROCEDURE — 86900 BLOOD TYPING SEROLOGIC ABO: CPT

## 2020-03-05 PROCEDURE — 96361 HYDRATE IV INFUSION ADD-ON: CPT | Mod: XU

## 2020-03-05 PROCEDURE — 70553 MRI BRAIN STEM W/O & W/DYE: CPT

## 2020-03-05 PROCEDURE — 86803 HEPATITIS C AB TEST: CPT

## 2020-03-05 PROCEDURE — 70543 MRI ORBT/FAC/NCK W/O &W/DYE: CPT

## 2020-03-05 PROCEDURE — 84100 ASSAY OF PHOSPHORUS: CPT

## 2020-03-05 PROCEDURE — 80074 ACUTE HEPATITIS PANEL: CPT

## 2020-03-05 PROCEDURE — 88313 SPECIAL STAINS GROUP 2: CPT

## 2020-03-05 PROCEDURE — 86850 RBC ANTIBODY SCREEN: CPT

## 2020-03-05 PROCEDURE — 88305 TISSUE EXAM BY PATHOLOGIST: CPT | Mod: 26

## 2020-03-05 PROCEDURE — 94640 AIRWAY INHALATION TREATMENT: CPT

## 2020-03-05 PROCEDURE — 99285 EMERGENCY DEPT VISIT HI MDM: CPT | Mod: 25

## 2020-03-05 PROCEDURE — 83735 ASSAY OF MAGNESIUM: CPT

## 2020-03-05 PROCEDURE — 86480 TB TEST CELL IMMUN MEASURE: CPT

## 2020-03-05 PROCEDURE — 85027 COMPLETE CBC AUTOMATED: CPT

## 2020-03-05 PROCEDURE — 86901 BLOOD TYPING SEROLOGIC RH(D): CPT

## 2020-03-05 PROCEDURE — 37609 LIGATION/BX TEMPORAL ARTERY: CPT

## 2020-03-05 PROCEDURE — 88305 TISSUE EXAM BY PATHOLOGIST: CPT

## 2020-03-05 PROCEDURE — 86140 C-REACTIVE PROTEIN: CPT

## 2020-03-05 PROCEDURE — 99232 SBSQ HOSP IP/OBS MODERATE 35: CPT

## 2020-03-05 PROCEDURE — 80048 BASIC METABOLIC PNL TOTAL CA: CPT

## 2020-03-05 PROCEDURE — A9585: CPT

## 2020-03-05 PROCEDURE — 70450 CT HEAD/BRAIN W/O DYE: CPT

## 2020-03-05 PROCEDURE — 85730 THROMBOPLASTIN TIME PARTIAL: CPT

## 2020-03-05 PROCEDURE — 85652 RBC SED RATE AUTOMATED: CPT

## 2020-03-05 PROCEDURE — 88313 SPECIAL STAINS GROUP 2: CPT | Mod: 26

## 2020-03-05 RX ORDER — PANTOPRAZOLE SODIUM 20 MG/1
1 TABLET, DELAYED RELEASE ORAL
Qty: 60 | Refills: 0
Start: 2020-03-05 | End: 2020-04-03

## 2020-03-05 RX ORDER — ONDANSETRON 8 MG/1
4 TABLET, FILM COATED ORAL ONCE
Refills: 0 | Status: DISCONTINUED | OUTPATIENT
Start: 2020-03-05 | End: 2020-03-05

## 2020-03-05 RX ORDER — ACETAMINOPHEN 500 MG
2 TABLET ORAL
Qty: 0 | Refills: 0 | DISCHARGE
Start: 2020-03-05

## 2020-03-05 RX ORDER — FLUTICASONE PROPIONATE 50 MCG
1 SPRAY, SUSPENSION NASAL
Qty: 1 | Refills: 0
Start: 2020-03-05 | End: 2020-03-11

## 2020-03-05 RX ORDER — HYDROMORPHONE HYDROCHLORIDE 2 MG/ML
1 INJECTION INTRAMUSCULAR; INTRAVENOUS; SUBCUTANEOUS
Refills: 0 | Status: DISCONTINUED | OUTPATIENT
Start: 2020-03-05 | End: 2020-03-05

## 2020-03-05 RX ORDER — PANTOPRAZOLE SODIUM 20 MG/1
40 TABLET, DELAYED RELEASE ORAL
Refills: 0 | Status: DISCONTINUED | OUTPATIENT
Start: 2020-03-05 | End: 2020-03-05

## 2020-03-05 RX ORDER — SODIUM CHLORIDE 0.65 %
2 AEROSOL, SPRAY (ML) NASAL
Qty: 1 | Refills: 0
Start: 2020-03-05 | End: 2020-03-14

## 2020-03-05 RX ORDER — AMPICILLIN SODIUM AND SULBACTAM SODIUM 250; 125 MG/ML; MG/ML
3 INJECTION, POWDER, FOR SUSPENSION INTRAMUSCULAR; INTRAVENOUS EVERY 6 HOURS
Refills: 0 | Status: DISCONTINUED | OUTPATIENT
Start: 2020-03-05 | End: 2020-03-05

## 2020-03-05 RX ORDER — FLUTICASONE PROPIONATE 50 MCG
1 SPRAY, SUSPENSION NASAL
Refills: 0 | Status: DISCONTINUED | OUTPATIENT
Start: 2020-03-05 | End: 2020-03-05

## 2020-03-05 RX ORDER — ACETAMINOPHEN 500 MG
650 TABLET ORAL EVERY 6 HOURS
Refills: 0 | Status: DISCONTINUED | OUTPATIENT
Start: 2020-03-05 | End: 2020-03-05

## 2020-03-05 RX ORDER — SODIUM CHLORIDE 0.65 %
2 AEROSOL, SPRAY (ML) NASAL THREE TIMES A DAY
Refills: 0 | Status: DISCONTINUED | OUTPATIENT
Start: 2020-03-05 | End: 2020-03-05

## 2020-03-05 RX ORDER — HYDROMORPHONE HYDROCHLORIDE 2 MG/ML
0.5 INJECTION INTRAMUSCULAR; INTRAVENOUS; SUBCUTANEOUS
Refills: 0 | Status: DISCONTINUED | OUTPATIENT
Start: 2020-03-05 | End: 2020-03-05

## 2020-03-05 RX ORDER — SODIUM CHLORIDE 9 MG/ML
1000 INJECTION, SOLUTION INTRAVENOUS
Refills: 0 | Status: DISCONTINUED | OUTPATIENT
Start: 2020-03-05 | End: 2020-03-05

## 2020-03-05 RX ORDER — METOCLOPRAMIDE HCL 10 MG
10 TABLET ORAL ONCE
Refills: 0 | Status: DISCONTINUED | OUTPATIENT
Start: 2020-03-05 | End: 2020-03-05

## 2020-03-05 RX ADMIN — Medication 650 MILLIGRAM(S): at 00:20

## 2020-03-05 RX ADMIN — Medication 2 SPRAY(S): at 05:04

## 2020-03-05 RX ADMIN — Medication 1 SPRAY(S): at 05:04

## 2020-03-05 RX ADMIN — AMPICILLIN SODIUM AND SULBACTAM SODIUM 200 GRAM(S): 250; 125 INJECTION, POWDER, FOR SUSPENSION INTRAMUSCULAR; INTRAVENOUS at 12:18

## 2020-03-05 RX ADMIN — PANTOPRAZOLE SODIUM 40 MILLIGRAM(S): 20 TABLET, DELAYED RELEASE ORAL at 05:05

## 2020-03-05 RX ADMIN — AMPICILLIN SODIUM AND SULBACTAM SODIUM 200 GRAM(S): 250; 125 INJECTION, POWDER, FOR SUSPENSION INTRAMUSCULAR; INTRAVENOUS at 05:04

## 2020-03-05 RX ADMIN — OXYMETAZOLINE HYDROCHLORIDE 1 SPRAY(S): 0.5 SPRAY NASAL at 05:04

## 2020-03-05 RX ADMIN — Medication 60 MILLIGRAM(S): at 05:05

## 2020-03-05 RX ADMIN — SODIUM CHLORIDE 75 MILLILITER(S): 9 INJECTION, SOLUTION INTRAVENOUS at 10:59

## 2020-03-05 NOTE — CHART NOTE - NSCHARTNOTEFT_GEN_A_CORE
POST-OPERATIVE NOTE    Subjective:  Patient is s/p lef temporal artery biopsy . Recovering appropriately. Denies pain, tolerating diet    Vital Signs Last 24 Hrs  T(C): 36.3 (05 Mar 2020 11:05), Max: 36.7 (04 Mar 2020 21:11)  T(F): 97.3 (05 Mar 2020 11:05), Max: 98 (04 Mar 2020 21:11)  HR: 77 (05 Mar 2020 11:05) (60 - 95)  BP: 121/73 (05 Mar 2020 11:05) (96/52 - 132/78)  BP(mean): 89 (05 Mar 2020 10:15) (70 - 90)  RR: 18 (05 Mar 2020 11:05) (16 - 18)  SpO2: 98% (05 Mar 2020 11:05) (95% - 98%)  I&O's Detail    04 Mar 2020 07:01  -  05 Mar 2020 07:00  --------------------------------------------------------  IN:  Total IN: 0 mL    OUT:  Total OUT: 0 mL    Total NET: 0 mL      05 Mar 2020 07:01  -  05 Mar 2020 15:23  --------------------------------------------------------  IN:    Solution: 100 mL  Total IN: 100 mL    OUT:  Total OUT: 0 mL    Total NET: 100 mL        amoxicillin  875 milliGRAM(s)/clavulanate 1  amoxicillin  875 milliGRAM(s)/clavulanate 1    PAST MEDICAL & SURGICAL HISTORY:  KARLA (obstructive sleep apnea): per criteria  History of cataract: 6/2019   Right: surgically treated  Deviated nasal septum: &#x27;99   surgically treated  Bilateral inguinal hernia  Fatty liver  Umbilical hernia: &#x27; 2016  surgically treated  S/P cataract extraction: 6/2019   Right  S/P colonoscopy: &#x27; 16  Negative  History of umbilical hernia repair: &#x27; 2016  with Mesh  H/O nasal septoplasty: &#x27;99        Physical Exam:  General: NAD, resting comfortably in bed  Pulmonary: Nonlabored breathing, no respiratory distress  Cardiovascular: NSR  Abdominal: soft, NT/ND  Extremities: WWP      LABS:                        11.9   10.64 )-----------( 194      ( 05 Mar 2020 06:55 )             36.4     03-05    141  |  106  |  22  ----------------------------<  109<H>  4.1   |  22  |  0.76    Ca    8.9      05 Mar 2020 06:53  Phos  3.5     03-05  Mg     2.1     03-05      PT/INR - ( 05 Mar 2020 08:00 )   PT: 12.6 sec;   INR: 1.10 ratio         PTT - ( 05 Mar 2020 08:00 )  PTT:24.3 sec  CAPILLARY BLOOD GLUCOSE          Radiology and Additional Studies:    Assessment:  The patient is a 59y Male who is now several hours post-op from a temporal artery biopsy    Plan:  - Pain control as needed  - REg diet  - DVT ppx  - OOB and ambulating as tolerated  - Care per primary team

## 2020-03-05 NOTE — DISCHARGE NOTE PROVIDER - NSDCMRMEDTOKEN_GEN_ALL_CORE_FT
biotin: 1 tab(s) orally once a day  Cipro 500 mg oral tablet: 1 tab(s) orally 2 times a day   Flagyl 500 mg oral tablet: 1 tab(s) orally 3 times a day   Multiple Vitamins oral capsule: 1 cap(s) orally once a day  oxyCODONE 5 mg oral tablet: 1 tab(s) orally every 6 hours MDD:4 acetaminophen 325 mg oral tablet: 2 tab(s) orally every 6 hours, As needed, Mild Pain (1 - 3)  biotin: 1 tab(s) orally once a day  Multiple Vitamins oral capsule: 1 cap(s) orally once a day  pantoprazole 40 mg oral delayed release tablet: 1 tab(s) orally 2 times a day  Continue while on steroids  predniSONE 20 mg oral tablet: 3 tab(s) orally once a day  You must follow up biopsy results within 5 days   sodium chloride 0.65% nasal spray: 2 spray(s) in each nostril 3 times a day acetaminophen 325 mg oral tablet: 2 tab(s) orally every 6 hours, As needed, Mild Pain (1 - 3)  amoxicillin-clavulanate 875 mg-125 mg oral tablet: 1 tab(s) orally 2 times a day  biotin: 1 tab(s) orally once a day  Multiple Vitamins oral capsule: 1 cap(s) orally once a day  pantoprazole 40 mg oral delayed release tablet: 1 tab(s) orally 2 times a day  Continue while on steroids  predniSONE 20 mg oral tablet: 3 tab(s) orally once a day  You must follow up biopsy results within 5 days   sodium chloride 0.65% nasal spray: 2 spray(s) in each nostril 3 times a day acetaminophen 325 mg oral tablet: 2 tab(s) orally every 6 hours, As needed, Mild Pain (1 - 3)  amoxicillin-clavulanate 875 mg-125 mg oral tablet: 1 tab(s) orally 2 times a day  biotin: 1 tab(s) orally once a day  fluticasone 50 mcg/inh nasal spray: 1 spray(s) nasal 2 times a day  Multiple Vitamins oral capsule: 1 cap(s) orally once a day  pantoprazole 40 mg oral delayed release tablet: 1 tab(s) orally 2 times a day  Continue while on steroids  predniSONE 20 mg oral tablet: 3 tab(s) orally once a day  You must follow up biopsy results within 5 days   sodium chloride 0.65% nasal spray: 2 spray(s) in each nostril 3 times a day

## 2020-03-05 NOTE — CHART NOTE - NSCHARTNOTEFT_GEN_A_CORE
Request from Dr. Diego to facilitate patient discharge.  Medication reconciliation reviewed, revised, and resolved with Dr. Digeo, who has medically cleared patient for discharge with follow up as advised.  Please refer to discharge note for detailed hospital course. Request from Dr. Diego to facilitate patient discharge.  As per Dr. Diego, patient will be discharged on prednisone 60mg po daily and will need to follow up with rheumatology within 5 days to determine when to taper medication.  Medication reconciliation reviewed, revised, and resolved with Dr. Diego, who has medically cleared patient for discharge with follow up as advised.  Please refer to discharge note for detailed hospital course.

## 2020-03-05 NOTE — BRIEF OPERATIVE NOTE - OPERATION/FINDINGS
Left temporal artery biopsy for rule-out giant cell arteritis. 2.5cm segment of left temporal artery sent for pathology.

## 2020-03-05 NOTE — DISCHARGE NOTE PROVIDER - CARE PROVIDERS DIRECT ADDRESSES
[email or web address]

## 2020-03-05 NOTE — PROGRESS NOTE ADULT - SUBJECTIVE AND OBJECTIVE BOX
Patient is a 59y old  Male who presents with a chief complaint of headache (05 Mar 2020 13:47)                                                               INTERVAL HPI/OVERNIGHT EVENTS:    REVIEW OF SYSTEMS:     CONSTITUTIONAL: No weakness, fevers or chills  EYES/ENT: No visual changes , no ear ache   NECK: No pain or stiffness  RESPIRATORY: No cough, wheezing,  No shortness of breath  CARDIOVASCULAR: No chest pain or palpitations  GASTROINTESTINAL: No abdominal pain  . No nausea, vomiting, or hematemesis; No diarrhea or constipation. No melena or hematochezia.  GENITOURINARY: No dysuria, frequency or hematuria  NEUROLOGICAL: No numbness or weakness                                                                                                                                                                                                                                                                                   Medications:  MEDICATIONS  (STANDING):  amoxicillin  875 milliGRAM(s)/clavulanate 1 Tablet(s) Oral two times a day  fluticasone propionate 50 MICROgram(s)/spray Nasal Spray 1 Spray(s) Both Nostrils two times a day  lactated ringers. 1000 milliLiter(s) (75 mL/Hr) IV Continuous <Continuous>  pantoprazole    Tablet 40 milliGRAM(s) Oral two times a day  predniSONE   Tablet 60 milliGRAM(s) Oral daily  sodium chloride 0.65% Nasal 2 Spray(s) Both Nostrils three times a day    MEDICATIONS  (PRN):  acetaminophen   Tablet .. 650 milliGRAM(s) Oral every 6 hours PRN Mild Pain (1 - 3)       Allergies    No Known Allergies    Intolerances      Vital Signs Last 24 Hrs  T(C): 36.5 (05 Mar 2020 15:24), Max: 36.7 (04 Mar 2020 21:11)  T(F): 97.7 (05 Mar 2020 15:24), Max: 98 (04 Mar 2020 21:11)  HR: 69 (05 Mar 2020 15:24) (60 - 95)  BP: 124/75 (05 Mar 2020 15:24) (96/52 - 132/78)  BP(mean): 89 (05 Mar 2020 10:15) (70 - 90)  RR: 18 (05 Mar 2020 15:24) (16 - 18)  SpO2: 97% (05 Mar 2020 15:24) (95% - 98%)  CAPILLARY BLOOD GLUCOSE          03-04 @ 07:01  -  03-05 @ 07:00  --------------------------------------------------------  IN: 0 mL / OUT: 0 mL / NET: 0 mL    03-05 @ 07:01  -  03-05 @ 19:31  --------------------------------------------------------  IN: 100 mL / OUT: 0 mL / NET: 100 mL      Physical Exam:    Daily Height in cm: 198.12 (05 Mar 2020 07:33)    Daily   General:  Well appearing, NAD, not cachetic  HEENT:  L temproal stitches   CV:  RRR, S1S2   Lungs:  CTA B/L, no wheezes, rales, rhonchi  Abdomen:  Soft, non-tender, no distended, positive BS  Extremities:  2+ pulses, no c/c, no edema  Skin:  Warm and dry, no rashes  :  No daniels  Neuro:  AAOx3, non-focal, grossly intact                                                                                                                                                                                                                                                                                                LABS:                               11.9   10.64 )-----------( 194      ( 05 Mar 2020 06:55 )             36.4                      03-05    141  |  106  |  22  ----------------------------<  109<H>  4.1   |  22  |  0.76    Ca    8.9      05 Mar 2020 06:53  Phos  3.5     03-05  Mg     2.1     03-05

## 2020-03-05 NOTE — DISCHARGE NOTE NURSING/CASE MANAGEMENT/SOCIAL WORK - PATIENT PORTAL LINK FT
You can access the FollowMyHealth Patient Portal offered by [facility] by registering at the following website: [email or web address]. By joining [facility]’s FollowMyHealth portal, you will also be able to view your health information using other applications (apps) compatible with our system.

## 2020-03-05 NOTE — PROGRESS NOTE ADULT - SUBJECTIVE AND OBJECTIVE BOX
ENT ISSUE/POD: sinusitis     HPI: 59y man with a PMH of KARLA, cataracts s/p cataract surgery, nasal septum deviation s/p repair who presented on 03-02-20 with severe HA x 7-10 days. Found to have sinusitis on CT. Nasal endoscopy performed showed drainage from left middle meatus and polyp. Pt started on unasyn, afrin, flonase and nasal saline. c/o mild HA overnight. Pt denies congestion, facial pain, facial tenderness, rhinorrhea, PND or changes in vision.         PAST MEDICAL & SURGICAL HISTORY:  KARLA (obstructive sleep apnea): per criteria  History of cataract: 6/2019   Right: surgically treated  Deviated nasal septum: &#x27;99   surgically treated  Bilateral inguinal hernia  Fatty liver  Umbilical hernia: &#x27; 2016  surgically treated  S/P cataract extraction: 6/2019   Right  S/P colonoscopy: &#x27; 16  Negative  History of umbilical hernia repair: &#x27; 2016  with Mesh  H/O nasal septoplasty: &#x27;99    Allergies    No Known Allergies    Intolerances        MEDICATIONS  (STANDING):  ampicillin/sulbactam  IVPB 3 Gram(s) IV Intermittent every 6 hours  ampicillin/sulbactam  IVPB      chlorhexidine 4% Liquid 1 Application(s) Topical daily  fluticasone propionate 50 MICROgram(s)/spray Nasal Spray 1 Spray(s) Both Nostrils two times a day  oxymetazoline 0.05% Nasal Spray 1 Spray(s) Both Nostrils two times a day  pantoprazole    Tablet 40 milliGRAM(s) Oral two times a day  predniSONE   Tablet 60 milliGRAM(s) Oral daily  sodium chloride 0.65% Nasal 2 Spray(s) Both Nostrils three times a day    MEDICATIONS  (PRN):      Social History: see consult    Family history: see consult    ROS:   ENT: all negative except as noted in HPI   Pulm: denies SOB, cough, hemoptysis  Neuro: denies numbness/tingling, loss of sensation  Endo: denies heat/cold intolerance, excessive sweating      Vital Signs Last 24 Hrs  T(C): 36.5 (05 Mar 2020 04:37), Max: 36.7 (04 Mar 2020 21:11)  T(F): 97.7 (05 Mar 2020 04:37), Max: 98 (04 Mar 2020 21:11)  HR: 72 (05 Mar 2020 04:37) (72 - 102)  BP: 112/70 (05 Mar 2020 04:37) (112/70 - 132/78)  BP(mean): 8 (04 Mar 2020 13:04) (8 - 8)  RR: 16 (05 Mar 2020 04:37) (16 - 18)  SpO2: 95% (05 Mar 2020 04:37) (95% - 97%)         PHYSICAL EXAM:  Gen: NAD  Skin: No rashes, bruises, or lesions  Head: Normocephalic, Atraumatic  Face: no edema, erythema, or fluctuance. Parotid glands soft without mass, no temporal tenderness.  Eyes: no scleral injection, right eye veers lateral, EOM normal  Nose: Nares bilaterally patent, no discharge  Mouth: No Stridor / Drooling / Trismus.  Mucosa moist, tongue/uvula midline, oropharynx clear  Neck: Flat, supple, no lymphadenopathy, trachea midline, no masses  Lymphatic: No lymphadenopathy  Resp: breathing easily, no stridor  CV: no peripheral edema/cyanosis  GI: nondistended   Peripheral vascular: no JVD or edema  Neuro: facial nerve intact, no facial droop

## 2020-03-05 NOTE — PROGRESS NOTE ADULT - PROBLEM SELECTOR PLAN 1
-C/w unasyn   - nasal saline 2 sprays to b/l nares tid   - afrin 2 sprays/nostril tid x 4 days only  - HOB elevation   - will continue to follow.

## 2020-03-05 NOTE — DISCHARGE NOTE PROVIDER - CARE PROVIDER_API CALL
Dejah Martinez)  Internal Medicine; Rheumatology  [address] [facility], [address]  [place] [address]  Phone: [phone]  Fax: [phone]  Follow Up Time:     Sariah Glodsmith)  Neurology  [facility]  [address] [place] [address]  Phone: [phone]  Fax: [phone]  Follow Up Time:     Poncho Tan)  Vascular Surgery  [address] [place] [address], [address] [place] [address]  Phone: [phone]  Fax: [phone]  Follow Up Time:     Sonia Bass)  Otolaryngology  [address] [facility] [facility] [address], [address]  [place] [address]  Phone: [phone]  Fax: [phone]  Follow Up Time:

## 2020-03-05 NOTE — DISCHARGE NOTE PROVIDER - HOSPITAL COURSE
60 y/o male with no significant pmh , one episode of migraine HA years ago ( not similar to current presnetation) sent for evaluation for persistent LAMBERT from 's office.  In addition to his HA, he noted L eye pain, discharge from eye ( thicker than tears ) noted today.    CT head neg for vein thrombosis.  Moderate elevation of ESR.  Mild hyponatremia 133.  MR Head/Orbits shows mild left medial preseptal soft tissue swelling which may represent preseptal cellulitis. No evidence of post septal orbital cellulitis; significant opacification of the ethmoid and sphenoid sinuses suggesting sinus inflammatory changes. No extension into the orbits or cavernous sinuses. 58 y/o male with no significant pmh , one episode of migraine HA years ago ( not similar to current presnetation) sent for evaluation for persistent LAMBERT from 's office.  In addition to his HA, he noted L eye pain, discharge from eye ( thicker than tears ) noted today.  CT head neg for vein thrombosis.  Moderate elevation of ESR.  Mild hyponatremia 133.  MR Head/Orbits shows mild left medial preseptal soft tissue swelling which may represent preseptal cellulitis. No evidence of post septal orbital cellulitis; significant opacification of the ethmoid and sphenoid sinuses suggesting sinus inflammatory changes.   No extension into the orbits or cavernous sinuses.  Neurology, Rheumatology, Infectious Disease, and Opthalmology consulted.  ENT consulted for sinusitis - saline spray, afrin, and antibiotics given.  Status post temporal artery biopsy - will follow up outpatient for results.  Stable for discharge with PMD, Rheumatology, ENT, Neurology, and Opthalmology follow up. 60 y/o male with no significant pmh , one episode of migraine HA years ago ( not similar to current presnetation) sent for evaluation for persistent LAMBERT from 's office.  In addition to his HA, he noted L eye pain, discharge from eye ( thicker than tears ) noted today.  CT head neg for vein thrombosis.  Moderate elevation of ESR.  Mild hyponatremia 133.  MR Head/Orbits shows mild left medial preseptal soft tissue swelling which may represent preseptal cellulitis. No evidence of post septal orbital cellulitis; significant opacification of the ethmoid and sphenoid sinuses suggesting sinus inflammatory changes.   No extension into the orbits or cavernous sinuses.  Neurology, Rheumatology, Infectious Disease, and Opthalmology consulted.  ENT consulted for sinusitis - saline spray, afrin, and antibiotics given.  Status post temporal artery biopsy - will follow up outpatient for results.  Stable for discharge with PMD, Rheumatology, ENT, ID, Neurology, and Opthalmology follow up.

## 2020-03-05 NOTE — PROGRESS NOTE ADULT - ASSESSMENT
59y with +b/l sinusitis, nasal endoscope reveals drainage and polyp on left side. No longer complaining of facial / temporal pain.

## 2020-03-05 NOTE — DISCHARGE NOTE PROVIDER - PROVIDER TOKENS
PROVIDER:[TOKEN:[[id number]]],PROVIDER:[TOKEN:[[id number]]],PROVIDER:[TOKEN:[[id number]]],PROVIDER:[TOKEN:[[id number]]]

## 2020-03-05 NOTE — DISCHARGE NOTE NURSING/CASE MANAGEMENT/SOCIAL WORK - NSDCFUADDAPPT_GEN_ALL_CORE_FT
You must follow up with your rheumatologist (Dr. Peng Nur) and your primary medical doctor within 5 days of discharge - please call to make these appointments.  When you follow up with your rheumatologist, you must discuss you biopsy results to determine if you can taper off your steroids.    You will need to follow up with your vascular surgeon, Dr. Tan, within one week of discharge - please call to make an appointment.    You will need to follow up with your infectious disease doctor, Dr. Watters, with two weeks of discharge - please call [phone] to make an appointment.    You need to follow up with ENT, opthalmology, and neurology upon discharge.

## 2020-03-05 NOTE — PROGRESS NOTE ADULT - ASSESSMENT
58 y/o male with no significant pmh , one episode of migraine HA years ago ( not similar to current presnetation) sent for evaluation for persistent LAMBERT from 's office.  Reports frontal HA, constant with no N/V/Fever or chills / no rashes.. HA is at times throbbing but overall dull and constant.. worse upon changing position as he lays on R but worse as he lays on L and much worse the first few min when he stands.. no nuchal rigidindity or neck pain   no ear pain .. associated photophobia hwoever seems to have that at baseline though seems to be worse now.   motrin helped somewhat yesterday with pain...   in addition to his HA he noted L eye pain ( though mild )  discharge from eye ( thicker than tears ) noted today.  in ED vitally stable afeb   CT : neg for vein thrombosis .   moderate elevation of eSR   mild hyponatremia 133     - HA : with photophobia but no fever   discused w/ optho : scalp tenderness is not explained by sinusitis .. pt with temptoral tenderness and ? decreased pulses   vascular input noted :temporal artery bx done .. f/u bx as o/p cont empiric steroids for now   appreciate rheum input   neuro input appreciated : check MR for HA   NSAIDS for pain     - hyponatremia : mild  improved     - sinusitis : cont abx   MR shows preseptal cellulitis: change to augemntin and f/u with Dr. Cuenca as o/p     discussed with pt at length   d/w np   d/w ID /PMD

## 2020-03-05 NOTE — DISCHARGE NOTE PROVIDER - NSDCCPCAREPLAN_GEN_ALL_CORE_FT
PRINCIPAL DISCHARGE DIAGNOSIS  Diagnosis: Headache  Assessment and Plan of Treatment: You must follow up with your rheumatologist within 5 days - please call to make an appointment - at this time, you will discuss the biopsy results and determine if you can be tapered off of your steroids or if you should continue.  DO NOT stop your steroids without discussing the biopsy results with your physician.  Follow up with your vascular surgeon, Dr. De Leon, within 1-2 weeks of discharge.  You can follow up with neurology upon discharge.      SECONDARY DISCHARGE DIAGNOSES  Diagnosis: Preseptal cellulitis  Assessment and Plan of Treatment: You will take antibiotics (Augmentin) for 10 more days.  Follow up with Infectious Disease, Dr. Watters, within 2 weeks.    Diagnosis: Hyponatremia  Assessment and Plan of Treatment: Resolved  Follow up with your primary medical doctor within one week of discharge.    Diagnosis: Blurry vision  Assessment and Plan of Treatment: Resolved  Follow up with opthalmology upon discharge.    Diagnosis: Sinusitis  Assessment and Plan of Treatment: Follow up with ENT within 1-2 weeks of discharge.  Continue with antibiotics.

## 2020-03-05 NOTE — DISCHARGE NOTE PROVIDER - NSDCFUADDAPPT_GEN_ALL_CORE_FT
You must follow up with your rheumatologist (Dr. Peng Nur) and your primary medical doctor within 5 days of discharge - please call to make these appointments.  When you follow up with your rheumatologist, you must discuss you biopsy results to determine if you can taper off your steroids.    You will need to follow up with your vascular surgeon, Dr. Tan, within one week of discharge - please call to make an appointment.    You will need to follow up with your infectious disease doctor, Dr. Watters, with two weeks of discharge - please call [phone] to make an appointment.    You need to follow up with ENT, opthalmology, and neurology upon discharge.

## 2020-03-05 NOTE — CONSULT NOTE ADULT - SUBJECTIVE AND OBJECTIVE BOX
HPI:   Patient is a 59y male who developed a severe band like head ache starting about 12 days ago rather suddenly. It was worse with eye movement, maybe some thicker than tears left eye discharge, and some hyperesthesia over the scalp especially over his bald spot. No rash, no visual change ,no jaw claudication,  no hearing change.  It persisted and worse on left despite decongestants, no fever and by report did get a little swelling over the left orbit medially. He came to the hospital 3 days ago due to persistent symptoms. He had imaging showing sinus disease and a scope showed some purulent discharge. He was commenced on steroids for concern of temporal arteritis and had a biopsy of the left TA today. He is feeling much better. No travel, no swimming, no animal except pet dog. 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 He has chronic right eye ophthaloplegia    REVIEW OF SYSTEMS:  All other review of systems negative (Comprehensive ROS)    PAST MEDICAL & SURGICAL HISTORY:  KARLA (obstructive sleep apnea): per criteria  History of cataract: 6/2019   Right: surgically treated  Deviated nasal septum: &#x27;99   surgically treated  Bilateral inguinal hernia  Fatty liver  Umbilical hernia: &#x27; 2016  surgically treated  S/P cataract extraction: 6/2019   Right  S/P colonoscopy: &#x27; 16  Negative  History of umbilical hernia repair: &#x27; 2016  with Mesh  H/O nasal septoplasty: &#x27;99      Allergies    No Known Allergies    Intolerances        Antimicrobials Day #  :3/14  amoxicillin  875 milliGRAM(s)/clavulanate 1 Tablet(s) Oral two times a day    Other Medications:  acetaminophen   Tablet .. 650 milliGRAM(s) Oral every 6 hours PRN  fluticasone propionate 50 MICROgram(s)/spray Nasal Spray 1 Spray(s) Both Nostrils two times a day  lactated ringers. 1000 milliLiter(s) IV Continuous <Continuous>  pantoprazole    Tablet 40 milliGRAM(s) Oral two times a day  predniSONE   Tablet 60 milliGRAM(s) Oral daily  sodium chloride 0.65% Nasal 2 Spray(s) Both Nostrils three times a day      FAMILY HISTORY:  Family history of metastatic neoplastic disease: Mother      SOCIAL HISTORY:  Smoking: [ ]Yes [x ]No  ETOH: [ ]Yes [ ]xNo  Drug Use: [ ]Yes [ x]No   [ x] Single[ ]    T(F): 97.3 (03-05-20 @ 11:05), Max: 98 (03-04-20 @ 21:11)  HR: 77 (03-05-20 @ 11:05)  BP: 121/73 (03-05-20 @ 11:05)  RR: 18 (03-05-20 @ 11:05)  SpO2: 98% (03-05-20 @ 11:05)  Wt(kg): --    PHYSICAL EXAM:  General: alert, no acute distress  Eyes:  anicteric, no conjunctival injection, no discharge  Oropharynx: no lesions or injection 	  Neck: supple, without adenopathy  Lungs: clear to auscultation  Heart: regular rate and rhythm; no murmur, rubs or gallops  Abdomen: soft, nondistended, nontender, without mass or organomegaly  Skin: no lesions  Extremities: no clubbing, cyanosis, or edema  Neurologic: alert, oriented, moves all extremities    LAB RESULTS:                        11.9   10.64 )-----------( 194      ( 05 Mar 2020 06:55 )             36.4     03-05    141  |  106  |  22  ----------------------------<  109<H>  4.1   |  22  |  0.76    Ca    8.9      05 Mar 2020 06:53  Phos  3.5     03-05  Mg     2.1     03-05            MICROBIOLOGY:  RECENT CULTURES:        RADIOLOGY REVIEWED:  < from: MR Orbits w/wo IV Cont (03.04.20 @ 09:36) >  IMPRESSION:    Mild left medial preseptal soft tissue swelling which may represent preseptal cellulitis. No evidence of post septal orbital cellulitis.    Significant opacification of the ethmoid and sphenoid sinuses suggesting sinus inflammatory changes. No extension into the orbits or cavernous sinuses.    < end of copied text >  < from: MR Head w/wo IV Cont (03.04.20 @ 09:35) >  IMPRESSION:    Mild left medial preseptal soft tissue swelling which may represent preseptal cellulitis. No evidence of post septal orbital cellulitis.    Significant opacification of the ethmoid and sphenoid sinuses suggesting sinus inflammatory changes. No extension into the orbits or cavernous sinuses.    < end of copied text >      < from: CT Angio Head w/ IV Cont (03.02.20 @ 18:15) >  IMPRESSION:     CT head:    No acute intracranial hemorrhage, mass effect, vasogenic edema, or evidence of acute territorial infarct.    Air-fluid levels in the bilateral maxillary and ethmoid sinuses. Complete opacificationof the sphenoid sinus. Correlate for the presence of acute sinusitis.    CTV brain:    No evidence for venous sinus or cortical vein thrombosis.      < end of copied text >      Impression: Patient with sinusitis, preseptal cellulitis both seem controlled on antibiotics and now with steroids as well directed at possible gca. He appears to be doing well at present.     Recommendations:  would continue the antibiotics with oral augmentin for another 10 days  steroids per ophtho but if he has gca obviously cannot stop even with possible infection. His infection certainly does not appear uncontrolled at present  await artery path  no ID objection to d/c home, f/u in my office in 1-2 wees

## 2021-02-03 NOTE — ED PROVIDER NOTE - SCRIBE NAME
PT qualified B vs B+ only OD first then B vs B+ vs CV vs Advanced TMF +3.25 OS. Ingrid Meyer (Scribe)

## 2023-06-14 ENCOUNTER — OUTPATIENT (OUTPATIENT)
Dept: OUTPATIENT SERVICES | Facility: HOSPITAL | Age: 63
LOS: 1 days | End: 2023-06-14
Payer: COMMERCIAL

## 2023-06-14 ENCOUNTER — APPOINTMENT (OUTPATIENT)
Dept: RADIOLOGY | Facility: CLINIC | Age: 63
End: 2023-06-14
Payer: COMMERCIAL

## 2023-06-14 DIAGNOSIS — Z98.890 OTHER SPECIFIED POSTPROCEDURAL STATES: Chronic | ICD-10-CM

## 2023-06-14 DIAGNOSIS — Z00.8 ENCOUNTER FOR OTHER GENERAL EXAMINATION: ICD-10-CM

## 2023-06-14 DIAGNOSIS — Z98.49 CATARACT EXTRACTION STATUS, UNSPECIFIED EYE: Chronic | ICD-10-CM

## 2023-06-14 PROCEDURE — 72170 X-RAY EXAM OF PELVIS: CPT

## 2023-06-14 PROCEDURE — 72170 X-RAY EXAM OF PELVIS: CPT | Mod: 26

## 2023-06-14 PROCEDURE — 72050 X-RAY EXAM NECK SPINE 4/5VWS: CPT

## 2023-06-14 PROCEDURE — 72050 X-RAY EXAM NECK SPINE 4/5VWS: CPT | Mod: 26

## 2023-06-14 PROCEDURE — 72110 X-RAY EXAM L-2 SPINE 4/>VWS: CPT | Mod: 26

## 2023-06-14 PROCEDURE — 72110 X-RAY EXAM L-2 SPINE 4/>VWS: CPT

## 2024-09-19 NOTE — H&P PST ADULT - DENTITION
Called patient's wife back and informed that urine culture results were negative.     Lynda Arroyo LPN     normal
